# Patient Record
Sex: FEMALE | URBAN - METROPOLITAN AREA
[De-identification: names, ages, dates, MRNs, and addresses within clinical notes are randomized per-mention and may not be internally consistent; named-entity substitution may affect disease eponyms.]

---

## 2018-09-12 ENCOUNTER — TRANSFERRED RECORDS (OUTPATIENT)
Dept: HEALTH INFORMATION MANAGEMENT | Facility: CLINIC | Age: 23
End: 2018-09-12

## 2018-10-19 ENCOUNTER — TRANSFERRED RECORDS (OUTPATIENT)
Dept: HEALTH INFORMATION MANAGEMENT | Facility: CLINIC | Age: 23
End: 2018-10-19

## 2019-02-01 ENCOUNTER — TRANSFERRED RECORDS (OUTPATIENT)
Dept: HEALTH INFORMATION MANAGEMENT | Facility: CLINIC | Age: 24
End: 2019-02-01

## 2019-02-01 ENCOUNTER — MEDICAL CORRESPONDENCE (OUTPATIENT)
Dept: HEALTH INFORMATION MANAGEMENT | Facility: CLINIC | Age: 24
End: 2019-02-01

## 2019-02-12 ENCOUNTER — TELEPHONE (OUTPATIENT)
Dept: OPHTHALMOLOGY | Facility: CLINIC | Age: 24
End: 2019-02-12

## 2019-03-29 ENCOUNTER — OFFICE VISIT (OUTPATIENT)
Dept: OPHTHALMOLOGY | Facility: CLINIC | Age: 24
End: 2019-03-29
Attending: OPHTHALMOLOGY
Payer: COMMERCIAL

## 2019-03-29 ENCOUNTER — ANCILLARY PROCEDURE (OUTPATIENT)
Dept: CT IMAGING | Facility: CLINIC | Age: 24
End: 2019-03-29
Attending: OPHTHALMOLOGY
Payer: COMMERCIAL

## 2019-03-29 DIAGNOSIS — H53.10 SUBJECTIVE VISUAL DISTURBANCE: ICD-10-CM

## 2019-03-29 DIAGNOSIS — H53.2 DOUBLE VISION: ICD-10-CM

## 2019-03-29 DIAGNOSIS — H35.063 RETINAL VASCULITIS OF BOTH EYES: Primary | ICD-10-CM

## 2019-03-29 DIAGNOSIS — H35.063 RETINAL VASCULITIS OF BOTH EYES: ICD-10-CM

## 2019-03-29 LAB
ALBUMIN SERPL-MCNC: 4.4 G/DL (ref 3.4–5)
ALP SERPL-CCNC: 68 U/L (ref 40–150)
ALT SERPL W P-5'-P-CCNC: 18 U/L (ref 0–50)
ANION GAP SERPL CALCULATED.3IONS-SCNC: 6 MMOL/L (ref 3–14)
AST SERPL W P-5'-P-CCNC: 12 U/L (ref 0–45)
BILIRUB SERPL-MCNC: 0.5 MG/DL (ref 0.2–1.3)
BUN SERPL-MCNC: 11 MG/DL (ref 7–30)
CALCIUM SERPL-MCNC: 9.1 MG/DL (ref 8.5–10.1)
CHLORIDE SERPL-SCNC: 105 MMOL/L (ref 94–109)
CO2 SERPL-SCNC: 25 MMOL/L (ref 20–32)
CREAT SERPL-MCNC: 0.61 MG/DL (ref 0.52–1.04)
ERYTHROCYTE [DISTWIDTH] IN BLOOD BY AUTOMATED COUNT: 12.3 % (ref 10–15)
GFR SERPL CREATININE-BSD FRML MDRD: >90 ML/MIN/{1.73_M2}
GLUCOSE SERPL-MCNC: 90 MG/DL (ref 70–99)
HCT VFR BLD AUTO: 43.1 % (ref 35–47)
HGB BLD-MCNC: 13.8 G/DL (ref 11.7–15.7)
MCH RBC QN AUTO: 28.7 PG (ref 26.5–33)
MCHC RBC AUTO-ENTMCNC: 32 G/DL (ref 31.5–36.5)
MCV RBC AUTO: 90 FL (ref 78–100)
MISCELLANEOUS TEST: NORMAL
PLATELET # BLD AUTO: 255 10E9/L (ref 150–450)
POTASSIUM SERPL-SCNC: 3.9 MMOL/L (ref 3.4–5.3)
PROT SERPL-MCNC: 7.9 G/DL (ref 6.8–8.8)
RBC # BLD AUTO: 4.81 10E12/L (ref 3.8–5.2)
SODIUM SERPL-SCNC: 137 MMOL/L (ref 133–144)
WBC # BLD AUTO: 3.9 10E9/L (ref 4–11)

## 2019-03-29 PROCEDURE — 92060 SENSORIMOTOR EXAMINATION: CPT | Mod: ZF | Performed by: OPHTHALMOLOGY

## 2019-03-29 PROCEDURE — G0463 HOSPITAL OUTPT CLINIC VISIT: HCPCS | Mod: 25,ZF

## 2019-03-29 RX ORDER — IOPAMIDOL 755 MG/ML
74 INJECTION, SOLUTION INTRAVASCULAR ONCE
Status: COMPLETED | OUTPATIENT
Start: 2019-03-29 | End: 2019-03-29

## 2019-03-29 RX ADMIN — IOPAMIDOL 74 ML: 755 INJECTION, SOLUTION INTRAVASCULAR at 14:04

## 2019-03-29 ASSESSMENT — TONOMETRY
IOP_METHOD: ICARE
OS_IOP_MMHG: 19
OD_IOP_MMHG: 17

## 2019-03-29 ASSESSMENT — REFRACTION_WEARINGRX
OD_AXIS: 062
OD_CYLINDER: +0.75
SPECS_TYPE: SVL
OS_CYLINDER: SPHERE
OD_SPHERE: -5.75
OS_SPHERE: -5.50

## 2019-03-29 ASSESSMENT — SLIT LAMP EXAM - LIDS
COMMENTS: NORMAL, NO LID RETRACTION
COMMENTS: NORMAL, NO LID RETRACTION

## 2019-03-29 ASSESSMENT — CUP TO DISC RATIO
OD_RATIO: 0.4
OS_RATIO: 0.4

## 2019-03-29 ASSESSMENT — MARGIN REFLEX DISTANCE
OS_MRD1: 3
OD_MRD1: 3

## 2019-03-29 ASSESSMENT — CONF VISUAL FIELD
OD_NORMAL: 1
OS_NORMAL: 1

## 2019-03-29 ASSESSMENT — VISUAL ACUITY
OD_CC: 20/20
OS_CC: 20/20
CORRECTION_TYPE: GLASSES
OD_CC+: -2
METHOD: SNELLEN - LINEAR

## 2019-03-29 ASSESSMENT — LAGOPHTHALMOS
OD_LAGOPHTHALMOS: 0
OS_LAGOPHTHALMOS: 0

## 2019-03-29 NOTE — LETTER
2019    RE: Liu Jackman  : 1995  MRN: [id number]    Dear Dr. Araujo,    Thank you for referring your patient, Liu Jackman, to my neuro-ophthalmology clinic recently.  After a thorough neuro-ophthalmic history and examination, I came to the following conclusions:      1. Esotropia x 1 year duration with binocular diplopia.  Relatively comitant angle and nearly full motility which could suggest decompensation of a congenital phoria however the patient also demonstrates definite evidence of perivenular sheathing / candle wax drippings bilaterally and possible borderline bilateral enlarged lacrimal glands on examination:     Liu Jackman is a pleasant 23 year old  female who presents to my neuro-ophthalmology clinic today for esotropia and double vision.     She first double vision and turning in of the left eye around . It took about 3-4 months and her left eye gradually turned in more and more. It then stabilized. She has constant horizontal double vision. No variation. No droopy eyelids. No trouble swallowing, drooling during the day. She does not patch for diplopia relief. She has gotten used to the double vision.     She saw Dr. Araujo 2018 and . Per patient, she was told that there was no change in her exam.     MRI face neck orbit with and without contrast 10/19/18 - bilaterally enlarged rectus muscles per radiology report.  On my review of the images I do not see that the medial rectus muscles are any more enlarged than other recti muscles. All muscles look possibly borderline enlarged but this is not definitive in my mind and could still be physiologic.  The lacrimal glands bilaterally also looked possibly mildly enlarged bilaterally.    Labs: TSH 1.206 (within normal limit). TSI within normal limits 3/11/2019. She had a thyroid ultrasound done on 3/22/19 - which per patient was unremarkable.     She denies any pain to her  lacrimal gland area, or pain with eye movement. She denies any history of rashes or trouble breathing/history of asthma. No history of cheek enlargement     Past med history: none  Meds: none  Past ocular history: none, denies history of strabismus or lazy eye  Family history: mom had thyroid cancer, no fam history of autoimmune conditions, including lupus, sarcoidosis, rheumatoid arthritis     On examination, visual acuity is 20/20 both eyes. She has a relatively comitant esotropia with trace abduction deficit bilaterally and a 30-45 prism diopter esotropia in all gaze positions. She does not have proptosis, eyelid retraction. Her bilateral lacrimal glands are borderline enlarged/inflammed. Dilated fundus exam showed perivenular peripheral vascular sheathing of both eyes- most prominent inferiorly.  There was only trace anterior vitreous debris / cells.  Cranial nerves V1-3, 7,8,9,11, and 12 were normal bilaterally.    PLAN:     In conclusion we have a 23-year-old  female with 1 year of binocular diplopia and a relatively concomitant esotropia.  She denies much in the way of any other associated symptoms.  The pattern of misalignment that she demonstrates could be consistent with decompensated congenital phoria however her retinal findings today of rony-venular vasculitis strongly suggest that there may be an alternative etiology.  The retinal findings today indicate a retinal vasculitis.  I will institute an initial workup for retinal vasculitis including a CT chest to look for evidence of sarcoid.  Depending on her testing results we may consider a trial of prednisone to see if this changes her esotropia.  I suppose it is also possible that the patient has a decompensated congenital esophoria but also harbor an unrelated asymptomatic retinal vasculitis.  This would seem most unusual however.      Order:  CT chest with contrast   Vasculitis panel, CBC, CMP, lymes, quant gold, RPR, Anti-nuclear  antibody, dsDNA, Sarcoidosis panel     Follow-up in 6 weeks or sooner depending on test results.  If esotropia remains stable for 6 months and does not respond to steroid treatment then could consider strabismus surgery.  Referral to retina if no definite etiology found for retinal findings.      Again, thank you for trusting me with the care of your patient.  For further exam details, please feel free to contact our office for additional records.  If you wish to contact me regarding this patient please email me at [email or web address] or give my clinic a call to arrange a phone conversation.    Sincerely,    Patricio Pathak MD  , Neuro-Ophthalmology and Adult Strabismus Surgery  The Gaby SLAUGHTER and Vivian Mayorga Chair in Neuro-Ophthalmology  Department of Ophthalmology and Visual Neurosciences  [facility]    DX: esotropia, retinal vasculitis

## 2019-03-29 NOTE — DISCHARGE INSTRUCTIONS

## 2019-03-29 NOTE — PROGRESS NOTES
1. Esotropia x 1 year duration with binocular diplopia.  Relatively comitant angle and nearly full motility which could suggest decompensation of a congenital phoria however the patient also demonstrates definite evidence of perivenular sheathing / candle wax drippings bilaterally and possible borderline bilateral enlarged lacrimal glands on examination:     Liu Jackman is a pleasant 23 year old  female who presents to my neuro-ophthalmology clinic today for esotropia and double vision.     She first double vision and turning in of the left eye around Jan of 2018. It took about 3-4 months and her left eye gradually turned in more and more. It then stabilized. She has constant horizontal double vision. No variation. No droopy eyelids. No trouble swallowing, drooling during the day. She does not patch for diplopia relief. She has gotten used to the double vision.     She saw Dr. Araujo September 2018 and Jan of 2019. Per patient, she was told that there was no change in her exam.     MRI face neck orbit with and without contrast 10/19/18 - bilaterally enlarged rectus muscles per radiology report.  On my review of the images I do not see that the medial rectus muscles are any more enlarged than other recti muscles. All muscles look possibly borderline enlarged but this is not definitive in my mind and could still be physiologic.  The lacrimal glands bilaterally also looked possibly mildly enlarged bilaterally.    Labs: TSH 1.206 (within normal limit). TSI within normal limits 3/11/2019. She had a thyroid ultrasound done on 3/22/19 - which per patient was unremarkable.     She denies any pain to her lacrimal gland area, or pain with eye movement. She denies any history of rashes or trouble breathing/history of asthma. No history of cheek enlargement     Past med history: none  Meds: none  Past ocular history: none, denies history of strabismus or lazy eye  Family history: mom had thyroid cancer,  no fam history of autoimmune conditions, including lupus, sarcoidosis, rheumatoid arthritis     On examination, visual acuity is 20/20 both eyes. She has a relatively comitant esotropia with trace abduction deficit bilaterally and a 30-45 prism diopter esotropia in all gaze positions. She does not have proptosis, eyelid retraction. Her bilateral lacrimal glands are borderline enlarged/inflammed. Dilated fundus exam showed perivenular peripheral vascular sheathing of both eyes- most prominent inferiorly.  There was only trace anterior vitreous debris / cells.  Cranial nerves V1-3, 7,8,9,11, and 12 were normal bilaterally.    PLAN:     In conclusion we have a 23-year-old  female with 1 year of binocular diplopia and a relatively concomitant esotropia.  She denies much in the way of any other associated symptoms.  The pattern of misalignment that she demonstrates could be consistent with decompensated congenital phoria however her retinal findings today of rony-venular vasculitis strongly suggest that there may be an alternative etiology.  The retinal findings today indicate a retinal vasculitis.  I will institute an initial workup for retinal vasculitis including a CT chest to look for evidence of sarcoid.  Depending on her testing results we may consider a trial of prednisone to see if this changes her esotropia.  I suppose it is also possible that the patient has a decompensated congenital esophoria but also harbor an unrelated asymptomatic retinal vasculitis.  This would seem most unusual however.      Order:  CT chest with contrast   Vasculitis panel, CBC, CMP, lymes, quant gold, RPR, Anti-nuclear antibody, dsDNA, Sarcoidosis panel     Follow-up in 6 weeks or sooner depending on test results.  If esotropia remains stable for 6 months and does not respond to steroid treatment then could consider strabismus surgery.  Referral to retina if no definite etiology found for retinal findings.         I  spent a total of 60 minutes face to face with Liu Jackman during today's office visit.  Over 50% of this time was spent counseling the patient and/or coordinating care regarding her esotropia and retinal vasculitis.    Complete documentation of historical and exam elements from today's encounter can be found in the full encounter summary report (not reduplicated in this progress note).  I personally obtained the chief complaint(s) and history of present illness.  I confirmed and edited as necessary the review of systems, past medical/surgical history, family history, social history, and examination findings as documented by others; and I examined the patient myself.  I personally reviewed the relevant tests, images, and reports as documented above.  I formulated and edited as necessary the assessment and plan and discussed the findings and management plan with the patient and family.  I personally reviewed the ophthalmic test(s) associated with this encounter, agree with the interpretation(s) as documented by the resident/fellow, and have edited the corresponding report(s) as necessary.     Patricio Pathak MD

## 2019-03-29 NOTE — NURSING NOTE
Chief Complaint(s) and History of Present Illness(es)     Strabismus Evaluation     In both eyes.  Associated symptoms include Negative for headaches, blurred vision, eye pain and dizziness.              Comments     Pt here for a strabismus evaluation referred by Dr. Araujo. Pt notes LE turning in causing him to have double vision. Pt noticed the LE turning in around Jan 2018. Pt states it was a gradual change with the LE (ex turning in when tired), but now LE is always turned in and pt cannot get it to go back to being straight. Pt states she gets the double vision with BE together and then it goes away when she closes one eye.     Tana Lynn, COMT 10:24 AM March 29, 2019

## 2019-03-30 LAB
MYELOPEROXIDASE AB SER-ACNC: <0.2 AI (ref 0–0.9)
PROTEINASE3 IGG SER-ACNC: <0.2 AI (ref 0–0.9)
T PALLIDUM AB SER QL: NONREACTIVE

## 2019-04-01 LAB
ANA PAT SER IF-IMP: ABNORMAL
ANA SER QL IF: ABNORMAL
ANA TITR SER IF: ABNORMAL {TITER}
B BURGDOR IGG+IGM SER QL: 0.03 (ref 0–0.89)
GAMMA INTERFERON BACKGROUND BLD IA-ACNC: 0.03 IU/ML
M TB IFN-G BLD-IMP: NEGATIVE
M TB IFN-G CD4+ BCKGRND COR BLD-ACNC: >10 IU/ML
MITOGEN IGNF BCKGRD COR BLD-ACNC: 0 IU/ML
MITOGEN IGNF BCKGRD COR BLD-ACNC: 0 IU/ML

## 2019-04-02 ENCOUNTER — TELEPHONE (OUTPATIENT)
Dept: OPHTHALMOLOGY | Facility: CLINIC | Age: 24
End: 2019-04-02

## 2019-04-02 DIAGNOSIS — H04.003 DACRYOADENITIS OF BOTH LACRIMAL GLANDS: Primary | ICD-10-CM

## 2019-04-02 LAB
ACE SERPL-CCNC: 14 U/L (ref 9–67)
DSDNA AB SER-ACNC: <1 IU/ML

## 2019-04-02 NOTE — TELEPHONE ENCOUNTER
Called patient - no answer, no VM    Called dad per request - told him that so far the labs and CT chest have been negative. NExt step would be a bilateral lacrimal gland biopsy to get tissue sample to see what's going on. Just because the labs and CT are negative does not mean she doesn't have vasculitis or sarcoidosis.     The plan would be for her to come down to see Aviva Pathak, retina specialist and Dr. Owens in one day. The following day, she can get the biopsy done with Dr. Owens.     I asked him to pass the message along to Liu and told them to call me back with any questions.     They will hear back shortly regarding details of appointment times/dates.

## 2019-04-02 NOTE — TELEPHONE ENCOUNTER
----- Message from Bertha Kaplan sent at 4/2/2019 11:51 AM CDT -----  After you chat with Ali, when you contact patient, if the patient doesn't answer main number could you contact father with results? I have it saved in comments. Patient/father concerned because patient is busy and might not be able to answer today and sometimes patient's phone doesn't work properly.  Thanks!    [phone]- Josiah marshall- pts dad

## 2019-04-04 LAB
LYSOZYME SERPL-MCNC: <0.7 UG/ML (ref 0–2.75)
RESULT: NORMAL
SEND OUTS MISC TEST CODE: NORMAL
SEND OUTS MISC TEST SPECIMEN: NORMAL
TEST NAME: NORMAL

## 2019-04-05 LAB — LAB SCANNED RESULT: NORMAL

## 2019-04-17 ENCOUNTER — TELEPHONE (OUTPATIENT)
Dept: OPHTHALMOLOGY | Facility: CLINIC | Age: 24
End: 2019-04-17

## 2019-04-17 NOTE — TELEPHONE ENCOUNTER
Spoke with patient to schedule surgery with Dr. Yuridia Owens.    Surgery was scheduled on 05/02 at [facility]  Patient will have H&P at [facility]   Post-Op visit: TBD  Patient is aware a / is needed day of surgery.   Surgery packet was mailed and e-mailed, patient has my direct contact information for any further questions.

## 2019-04-29 DIAGNOSIS — H35.063 RETINAL VASCULITIS OF BOTH EYES: Primary | ICD-10-CM

## 2019-05-01 ENCOUNTER — OFFICE VISIT (OUTPATIENT)
Dept: OPHTHALMOLOGY | Facility: CLINIC | Age: 24
End: 2019-05-01
Payer: COMMERCIAL

## 2019-05-01 ENCOUNTER — OFFICE VISIT (OUTPATIENT)
Dept: OPHTHALMOLOGY | Facility: CLINIC | Age: 24
End: 2019-05-01
Attending: OPHTHALMOLOGY
Payer: COMMERCIAL

## 2019-05-01 ENCOUNTER — ANESTHESIA EVENT (OUTPATIENT)
Dept: SURGERY | Facility: AMBULATORY SURGERY CENTER | Age: 24
End: 2019-05-01

## 2019-05-01 DIAGNOSIS — H04.003 DACRYOADENITIS OF BOTH LACRIMAL GLANDS: ICD-10-CM

## 2019-05-01 DIAGNOSIS — H35.063 RETINAL VASCULITIS OF BOTH EYES: ICD-10-CM

## 2019-05-01 DIAGNOSIS — H53.2 DOUBLE VISION: ICD-10-CM

## 2019-05-01 DIAGNOSIS — H35.063 RETINAL VASCULITIS OF BOTH EYES: Primary | ICD-10-CM

## 2019-05-01 DIAGNOSIS — H53.10 SUBJECTIVE VISUAL DISTURBANCE: Primary | ICD-10-CM

## 2019-05-01 PROCEDURE — 40000269 ZZH STATISTIC NO CHARGE FACILITY FEE: Mod: ZF

## 2019-05-01 PROCEDURE — 92133 CPTRZD OPH DX IMG PST SGM ON: CPT | Mod: ZF | Performed by: OPHTHALMOLOGY

## 2019-05-01 PROCEDURE — 92134 CPTRZ OPH DX IMG PST SGM RTA: CPT | Mod: ZF | Performed by: OPHTHALMOLOGY

## 2019-05-01 PROCEDURE — 99243 OFF/OP CNSLTJ NEW/EST LOW 30: CPT | Mod: 57 | Performed by: OPHTHALMOLOGY

## 2019-05-01 PROCEDURE — G0463 HOSPITAL OUTPT CLINIC VISIT: HCPCS | Mod: ZF

## 2019-05-01 PROCEDURE — 92083 EXTENDED VISUAL FIELD XM: CPT | Mod: ZF | Performed by: OPHTHALMOLOGY

## 2019-05-01 PROCEDURE — 92235 FLUORESCEIN ANGRPH MLTIFRAME: CPT | Mod: ZF | Performed by: OPHTHALMOLOGY

## 2019-05-01 RX ORDER — CIPROFLOXACIN 500 MG/1
TABLET, FILM COATED ORAL 2 TIMES DAILY
Refills: 0 | COMMUNITY
Start: 2019-04-23

## 2019-05-01 RX ORDER — PREDNISONE 20 MG/1
40 TABLET ORAL DAILY
Qty: 90 TABLET | Refills: 0 | Status: SHIPPED | OUTPATIENT
Start: 2019-05-02 | End: 2019-06-19

## 2019-05-01 ASSESSMENT — REFRACTION_WEARINGRX
SPECS_TYPE: SVL
SPECS_TYPE: SVL
OS_SPHERE: -5.50
OD_CYLINDER: +0.75
OD_CYLINDER: +0.75
OS_CYLINDER: SPHERE
OD_SPHERE: -5.75
OS_SPHERE: -5.50
OD_SPHERE: -5.75
OD_AXIS: 062
OD_AXIS: 062
OS_CYLINDER: SPHERE

## 2019-05-01 ASSESSMENT — VISUAL ACUITY
CORRECTION_TYPE: GLASSES
OS_CC+: -1
CORRECTION_TYPE: GLASSES
OS_CC: 20/20
OD_CC: 20/20
METHOD: SNELLEN - LINEAR
METHOD: SNELLEN - LINEAR
OD_CC: 20/20
OS_CC: 20/20
OS_CC: 20/20
CORRECTION_TYPE: GLASSES
OD_CC: J1+
METHOD: SNELLEN - LINEAR
OS_CC: J1+
OS_CC+: -1
OD_CC: 20/15

## 2019-05-01 ASSESSMENT — SLIT LAMP EXAM - LIDS
COMMENTS: NORMAL, NO LID RETRACTION

## 2019-05-01 ASSESSMENT — CONF VISUAL FIELD
METHOD: COUNTING FINGERS
METHOD: COUNTING FINGERS
OD_NORMAL: 1
OD_NORMAL: 1
OS_NORMAL: 1
OS_NORMAL: 1

## 2019-05-01 ASSESSMENT — CUP TO DISC RATIO
OD_RATIO: 0.4
OS_RATIO: 0.4
OS_RATIO: 0.4
OD_RATIO: 0.4

## 2019-05-01 ASSESSMENT — TONOMETRY
OS_IOP_MMHG: 15
IOP_METHOD: ICARE
IOP_METHOD: ICARE
OD_IOP_MMHG: 14
OD_IOP_MMHG: 14
OS_IOP_MMHG: 15

## 2019-05-01 NOTE — NURSING NOTE
Chief Complaints and History of Present Illnesses   Patient presents with     New Patient     Chief Complaint(s) and History of Present Illness(es)     New Patient               Comments     New patient for retinal evaluation.

## 2019-05-01 NOTE — PROGRESS NOTES
1. Comitant esotropia - stable measurements today.  Patient intolerant of Fresnel's today.  May be decompensated phoria versus vasculitis related.     2. Retinal vasculitis- exam today unchanged. Patient to see retina today.  Work-up thus far unrevealing.  Proceed with bilateral lacrimal gland biopsy tomorrow with Dr. Owens.      Alignment stable today. Patient denies afferent visual dysfunction (blurred or missing vision).  Per patient diplopia stable since last visit.  Patient does not like the blur induced by the Fresnel and thus prefers to go without prism.     Observe strabismus for stability.  Patient will likely initiate treatment with corticosteroids after biopsy as per Dr. Chase who patient saw today.    Octopus automated 30 degree visual field reliable and full in both eyes.    Follow-up with me in 3 months.  If strabismus unchanged then consider strabismus surgery at that time.         Complete documentation of historical and exam elements from today's encounter can be found in the full encounter summary report (not reduplicated in this progress note).  I personally re-obtained the chief complaint(s) and history of present illness.  I confirmed and edited as necessary the review of systems, past medical/surgical history, family history, social history, and examination findings as documented by others; and I examined the patient myself.  I personally reviewed the relevant tests, images, and reports as documented above.  I formulated and edited as necessary the assessment and plan and discussed the findings and management plan with the patient and family     A medical student was involved in the care of the patient. I was present with the medical student who participated in the service and in the documentation of the note. I have  verified the history and personally performed the physical exam and medical decision making. I extensively reviewed and edited when necessary the assessment and  plan. I agree with the assessment and plan of care as documented in the note    MD Lisa Deluna, MS4

## 2019-05-01 NOTE — PROGRESS NOTES
CC: establish care for possible retinal vasculitis     HPI: Liu Jackman is a  23 year old year-old patient referred by Dr Solares for evaluation and treat of possilbe vasculitis vs sarcoiditis. She was seen by Dr. 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 Though, exam revealed perivenular sheathing and candle wax drippings bilaterally. She is scheduled for a lacrimal biopsy 5-2-19 with Dr Shi. She reports no recent changes to visual acuity, no eye pain, no recent illness, no flashes or floaters. No family history of eye disease. No smoking.     Retinal Imaging:  OCT 5-1-19  RE: normal retinal layers, no IRF/SRF  LE: normal retinal layers, no IRF/SRF     Optos fundus 3/29/19    RIGHT eye: consistent with exam  left eye: consistent with exam    FA 5/1/19   right eye: good fill, peripheral leakage consistent with vasculitis  Left eye: good fill, peripheral leakage consistent with vasculitis    Laboratory:  Negative: quant gold, dsDNA, lyme, Anti-nuclear antibody, treponema Abs, Myeloperoxidase AbIgG, Preteinase 3 AbIgG, chitotriosidase, ACE, lysozyme, IL2     Assessment & Plan:    1. Retinal vasculitis both eyes    - No recent illness    - CT chest without evidence of sarcoid    - Laboratory workup negative (see above) for infectious etiology    - FA today with active vasculitis both eyes    - scheduled for lacrimal gland biposy with Dr. Owens on 5/2/19       - Start prednisone 40 mg daily (after biopsy)    - raniditine 150 mg twice a day    - If biopsy unrevealing will refer to Dr. Apodaca/ uveitis for further evaluation     2. 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 2/2 retinal vasculitis    - Follows with Dr. Pathak     3. Myopia both eyes    - Good correction (-5.5 D both eyes)   - Monitor     return to clinic: 4-6 weeks   Will repeat FA transit right eye in 3 months        Lukas Fermin MD  Ophthalmology  Resident, PGY-3  [facility]      ~~~~~~~~~~~~~~~~~~~~~~~~~~~~~~~~~~   Complete documentation of historical and exam elements from today's encounter can be found in the full encounter summary report (not reduplicated in this progress note).  I personally obtained the chief complaint(s) and history of present illness.  I confirmed and edited as necessary the review of systems, past medical/surgical history, family history, social history, and examination findings as documented by others; and I examined the patient myself.  I personally reviewed the relevant tests, images, and reports as documented above.  I personally reviewed the ophthalmic test(s) associated with this encounter, agree with the interpretation(s) as documented by the resident/fellow, and have edited the corresponding report(s) as necessary.   I formulated and edited as necessary the assessment and plan and discussed the findings and management plan with the patient and family    Pat Chase MD   of Ophthalmology.  Retina Service   Department of Ophthalmology and Visual Neurosciences   [facility]  Phone: [phone]   Fax: [phone]

## 2019-05-01 NOTE — LETTER
To whom it may concern,    Liu Jackman is under my care at the [facility]. She is undergoing surgery 5/2/2019. Please excuse her absence from 4/30/2019. She can return without restrictions on 5/13/2019. Additionally her father, Josiah Jackman Sr., is providing transportation and postoperative care, please excuse his absence from 4/30/2019 until 5/4/2019.    Please let us know if you have any questions or concerns.     Sincerely,        Yuridia Owens MD    Oculoplastic and Orbital Surgery   Department of Ophthalmology and Visual Neurosciences  [facility]

## 2019-05-01 NOTE — LETTER
5/1/2019       RE: Liu Jackman  [address]  [facility] [address]     Dear Colleague,    Thank you for referring your patient, Liu Jackman, to the EYE CLINIC at [facility]. Please see a copy of my visit note below.       CC: establish care for possible retinal vasculitis     HPI: Liu Jackman is a  23 year old year-old patient referred by Dr Solares for evaluation and treat of possilbe vasculitis vs sarcoiditis. She was seen by Dr. 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 Though, exam revealed perivenular sheathing and candle wax drippings bilaterally. She is scheduled for a lacrimal biopsy 5-2-19 with Dr Shi. She reports no recent changes to visual acuity, no eye pain, no recent illness, no flashes or floaters. No family history of eye disease. No smoking.     Retinal Imaging:  OCT 5-1-19  RE: normal retinal layers, no IRF/SRF  LE: normal retinal layers, no IRF/SRF     Optos fundus 3/29/19    RIGHT eye: consistent with exam  left eye: consistent with exam    FA 5/1/19   right eye: good fill, peripheral leakage consistent with vasculitis  Left eye: good fill, peripheral leakage consistent with vasculitis    Laboratory:  Negative: quant gold, dsDNA, lyme, Anti-nuclear antibody, treponema Abs, Myeloperoxidase AbIgG, Preteinase 3 AbIgG, chitotriosidase, ACE, lysozyme, IL2     Assessment & Plan:    1. Retinal vasculitis both eyes    - No recent illness    - CT chest without evidence of sarcoid    - Laboratory workup negative (see above) for infectious etiology    - FA today with active vasculitis both eyes    - scheduled for lacrimal gland biposy with Dr. Owesn on 5/2/19       - Start prednisone 40 mg daily (after biopsy)    - raniditine 150 mg twice a day    - If biopsy unrevealing will refer to Dr. Apodaca/ uveitis for further evaluation     2. Esotropia   - possible Decompensated congenital phoria  vs. 2/2 retinal vasculitis    - Follows with Dr. Pathak     3. Myopia both eyes    - Good correction (-5.5 D both eyes)   - Monitor     return to clinic: 4-6 weeks   Will repeat FA transit right eye in 3 months        Lukas Fermin MD  Ophthalmology Resident, PGY-3  [facility]      ~~~~~~~~~~~~~~~~~~~~~~~~~~~~~~~~~~   Complete documentation of historical and exam elements from today's encounter can be found in the full encounter summary report (not reduplicated in this progress note).  I personally obtained the chief complaint(s) and history of present illness.  I confirmed and edited as necessary the review of systems, past medical/surgical history, family history, social history, and examination findings as documented by others; and I examined the patient myself.  I personally reviewed the relevant tests, images, and reports as documented above.  I personally reviewed the ophthalmic test(s) associated with this encounter, agree with the interpretation(s) as documented by the resident/fellow, and have edited the corresponding report(s) as necessary.   I formulated and edited as necessary the assessment and plan and discussed the findings and management plan with the patient and family. Pat Chase MD    Again, thank you for allowing me to participate in the care of your patient.      Sincerely,    Pat Chase M.D.   of Ophthalmology  Vitreoretinal Surgeon  Department of Ophthalmology & Visual Neurosciences  [facility]  Phone:  [phone]   Fax:  [phone]

## 2019-05-01 NOTE — PROGRESS NOTES
Chief Complaint(s) and History of Present Illness(es)     No visit information to display       Liu Jackman is a 23 year old referred by Dr. Pathak for consideration of bilateral lacrimal gland biopsy.  About a year and a half ago, she noticed her left eye was turning in.  Has since stabilized.  She was not born with a lazy eye.  Imaging had been obtained, and the lacrimal glands on each side appeared moderately enlarged.  The extraocular muscles on my review appear overall normal.  There may be minimally enlarged.  Laboratory work-up has been unremarkable.    She has no orbital pain.  No orbital swelling.  He did see Dr. Chase this morning for retinal vasculitis also noted by Dr. Pathak and Dr. Solares.    This report was dictated using Dragon voice recognition software.    Assessment & Plan     Liu Jackman is a 23 year old female with the following diagnoses:   1. Retinal vasculitis of both eyes    2. Dacryoadenitis of both lacrimal glands    3. Double vision         We discussed risks benefits and alternatives to the proposed procedure, she elected to proceed with bilateral lacrimal gland biopsy tomorrow.  We discussed the risks in detail including failure to obtain a diagnosis, droopy eyelid, change in double vision, pupil size, and loss of vision.          Attending Physician Attestation:  Complete documentation of historical and exam elements from today's encounter can be found in the full encounter summary report (not reduplicated in this progress note).  I personally obtained the chief complaint(s) and history of present illness.  I confirmed and edited as necessary the review of systems, past medical/surgical history, family history, social history, and examination findings as documented by others; and I examined the patient myself.  I personally reviewed the relevant tests, images, and reports as documented above.  I formulated and edited as necessary the assessment and plan and  discussed the findings and management plan with the patient and family. - Yuridia Owens MD

## 2019-05-01 NOTE — LETTER
2019         RE:  :  MRN: Liu Jackman  1995  [id number]     Dear Dr. Pathak,    Thank you for asking me to see your patient, Liu Jackman, for an oculoplastic   consultation.  My assessment and plan are below.  For further details, please see my attached clinic note.           Chief Complaint(s) and History of Present Illness(es)     No visit information to display       Liu Jackman is a 23 year old referred by Dr. Pathak for consideration of bilateral lacrimal gland biopsy.  About a year and a half ago, she noticed her left eye was turning in.  Has since stabilized.  She was not born with a lazy eye.  Imaging had been obtained, and the lacrimal glands on each side appeared moderately enlarged.  The extraocular muscles on my review appear overall normal.  There may be minimally enlarged.  Laboratory work-up has been unremarkable.    She has no orbital pain.  No orbital swelling.  He did see Dr. Chase this morning for retinal vasculitis also noted by Dr. Pathak and Dr. Solares.    This report was dictated using Dragon voice recognition software.    Assessment & Plan     Liu Jackman is a 23 year old female with the following diagnoses:   1. Retinal vasculitis of both eyes    2. Dacryoadenitis of both lacrimal glands    3. Double vision         We discussed risks benefits and alternatives to the proposed procedure, she elected to proceed with bilateral lacrimal gland biopsy tomorrow.  We discussed the risks in detail including failure to obtain a diagnosis, droopy eyelid, change in double vision, pupil size, and loss of vision.         Again, thank you for allowing me to participate in the care of your patient.      Sincerely,    Yuridia Owens MD  Department of Ophthalmology and Visual Neurosciences  [facility]    CC: Patricio Pathak MD  [address] [place] [id number]  VIA In Basket

## 2019-05-02 ENCOUNTER — HOSPITAL ENCOUNTER (OUTPATIENT)
Facility: AMBULATORY SURGERY CENTER | Age: 24
End: 2019-05-02
Attending: OPHTHALMOLOGY
Payer: COMMERCIAL

## 2019-05-02 ENCOUNTER — ANESTHESIA (OUTPATIENT)
Dept: SURGERY | Facility: AMBULATORY SURGERY CENTER | Age: 24
End: 2019-05-02

## 2019-05-02 VITALS
OXYGEN SATURATION: 98 % | DIASTOLIC BLOOD PRESSURE: 79 MMHG | TEMPERATURE: 97.6 F | WEIGHT: 180 LBS | BODY MASS INDEX: 28.25 KG/M2 | RESPIRATION RATE: 16 BRPM | HEART RATE: 59 BPM | HEIGHT: 67 IN | SYSTOLIC BLOOD PRESSURE: 124 MMHG

## 2019-05-02 DIAGNOSIS — Z98.890 POSTOPERATIVE EYE STATE: Primary | ICD-10-CM

## 2019-05-02 LAB
HCG UR QL: NEGATIVE
INTERNAL QC OK POCT: YES

## 2019-05-02 RX ORDER — KETAMINE HYDROCHLORIDE 10 MG/ML
INJECTION, SOLUTION INTRAMUSCULAR; INTRAVENOUS PRN
Status: DISCONTINUED | OUTPATIENT
Start: 2019-05-02 | End: 2019-05-02

## 2019-05-02 RX ORDER — ONDANSETRON 4 MG/1
4 TABLET, ORALLY DISINTEGRATING ORAL EVERY 30 MIN PRN
Status: DISCONTINUED | OUTPATIENT
Start: 2019-05-02 | End: 2019-05-03 | Stop reason: HOSPADM

## 2019-05-02 RX ORDER — FENTANYL CITRATE 50 UG/ML
25-50 INJECTION, SOLUTION INTRAMUSCULAR; INTRAVENOUS
Status: DISCONTINUED | OUTPATIENT
Start: 2019-05-02 | End: 2019-05-02 | Stop reason: HOSPADM

## 2019-05-02 RX ORDER — DEXAMETHASONE SODIUM PHOSPHATE 4 MG/ML
INJECTION, SOLUTION INTRA-ARTICULAR; INTRALESIONAL; INTRAMUSCULAR; INTRAVENOUS; SOFT TISSUE PRN
Status: DISCONTINUED | OUTPATIENT
Start: 2019-05-02 | End: 2019-05-02

## 2019-05-02 RX ORDER — ONDANSETRON 2 MG/ML
INJECTION INTRAMUSCULAR; INTRAVENOUS PRN
Status: DISCONTINUED | OUTPATIENT
Start: 2019-05-02 | End: 2019-05-02

## 2019-05-02 RX ORDER — ONDANSETRON 2 MG/ML
4 INJECTION INTRAMUSCULAR; INTRAVENOUS EVERY 30 MIN PRN
Status: DISCONTINUED | OUTPATIENT
Start: 2019-05-02 | End: 2019-05-03 | Stop reason: HOSPADM

## 2019-05-02 RX ORDER — OXYCODONE HYDROCHLORIDE 5 MG/1
5 TABLET ORAL EVERY 6 HOURS PRN
Qty: 15 TABLET | Refills: 0 | Status: SHIPPED | OUTPATIENT
Start: 2019-05-02

## 2019-05-02 RX ORDER — OXYCODONE HYDROCHLORIDE 5 MG/1
5 TABLET ORAL EVERY 4 HOURS PRN
Status: DISCONTINUED | OUTPATIENT
Start: 2019-05-02 | End: 2019-05-03 | Stop reason: HOSPADM

## 2019-05-02 RX ORDER — ACETAMINOPHEN 325 MG/1
975 TABLET ORAL ONCE
Status: COMPLETED | OUTPATIENT
Start: 2019-05-02 | End: 2019-05-02

## 2019-05-02 RX ORDER — NALOXONE HYDROCHLORIDE 0.4 MG/ML
.1-.4 INJECTION, SOLUTION INTRAMUSCULAR; INTRAVENOUS; SUBCUTANEOUS
Status: DISCONTINUED | OUTPATIENT
Start: 2019-05-02 | End: 2019-05-03 | Stop reason: HOSPADM

## 2019-05-02 RX ORDER — LIDOCAINE 40 MG/G
CREAM TOPICAL
Status: DISCONTINUED | OUTPATIENT
Start: 2019-05-02 | End: 2019-05-02 | Stop reason: HOSPADM

## 2019-05-02 RX ORDER — PROPOFOL 10 MG/ML
INJECTION, EMULSION INTRAVENOUS PRN
Status: DISCONTINUED | OUTPATIENT
Start: 2019-05-02 | End: 2019-05-02

## 2019-05-02 RX ORDER — MEPERIDINE HYDROCHLORIDE 25 MG/ML
12.5 INJECTION INTRAMUSCULAR; INTRAVENOUS; SUBCUTANEOUS
Status: DISCONTINUED | OUTPATIENT
Start: 2019-05-02 | End: 2019-05-03 | Stop reason: HOSPADM

## 2019-05-02 RX ORDER — SODIUM CHLORIDE, SODIUM LACTATE, POTASSIUM CHLORIDE, CALCIUM CHLORIDE 600; 310; 30; 20 MG/100ML; MG/100ML; MG/100ML; MG/100ML
INJECTION, SOLUTION INTRAVENOUS CONTINUOUS
Status: DISCONTINUED | OUTPATIENT
Start: 2019-05-02 | End: 2019-05-02 | Stop reason: HOSPADM

## 2019-05-02 RX ORDER — PROPOFOL 10 MG/ML
INJECTION, EMULSION INTRAVENOUS CONTINUOUS PRN
Status: DISCONTINUED | OUTPATIENT
Start: 2019-05-02 | End: 2019-05-02

## 2019-05-02 RX ORDER — ERYTHROMYCIN 5 MG/G
OINTMENT OPHTHALMIC
Qty: 1 TUBE | Refills: 11 | Status: SHIPPED | OUTPATIENT
Start: 2019-05-02

## 2019-05-02 RX ORDER — NEOMYCIN SULFATE, POLYMYXIN B SULFATE AND DEXAMETHASONE 3.5; 10000; 1 MG/ML; [USP'U]/ML; MG/ML
1-2 SUSPENSION/ DROPS OPHTHALMIC 3 TIMES DAILY
Qty: 1 BOTTLE | Refills: 0 | Status: SHIPPED | OUTPATIENT
Start: 2019-05-02 | End: 2019-05-02

## 2019-05-02 RX ORDER — LIDOCAINE HYDROCHLORIDE 20 MG/ML
INJECTION, SOLUTION INFILTRATION; PERINEURAL PRN
Status: DISCONTINUED | OUTPATIENT
Start: 2019-05-02 | End: 2019-05-02

## 2019-05-02 RX ORDER — SODIUM CHLORIDE, SODIUM LACTATE, POTASSIUM CHLORIDE, CALCIUM CHLORIDE 600; 310; 30; 20 MG/100ML; MG/100ML; MG/100ML; MG/100ML
INJECTION, SOLUTION INTRAVENOUS CONTINUOUS
Status: DISCONTINUED | OUTPATIENT
Start: 2019-05-02 | End: 2019-05-03 | Stop reason: HOSPADM

## 2019-05-02 RX ORDER — HYDROCODONE BITARTRATE AND ACETAMINOPHEN 5; 325 MG/1; MG/1
1 TABLET ORAL
Status: DISCONTINUED | OUTPATIENT
Start: 2019-05-02 | End: 2019-05-03 | Stop reason: HOSPADM

## 2019-05-02 RX ORDER — TRIAMCINOLONE ACETONIDE 40 MG/ML
INJECTION, SUSPENSION INTRA-ARTICULAR; INTRAMUSCULAR PRN
Status: DISCONTINUED | OUTPATIENT
Start: 2019-05-02 | End: 2019-05-02 | Stop reason: HOSPADM

## 2019-05-02 RX ORDER — ERYTHROMYCIN 5 MG/G
OINTMENT OPHTHALMIC PRN
Status: DISCONTINUED | OUTPATIENT
Start: 2019-05-02 | End: 2019-05-02 | Stop reason: HOSPADM

## 2019-05-02 RX ADMIN — SODIUM CHLORIDE, SODIUM LACTATE, POTASSIUM CHLORIDE, CALCIUM CHLORIDE: 600; 310; 30; 20 INJECTION, SOLUTION INTRAVENOUS at 11:50

## 2019-05-02 RX ADMIN — PROPOFOL 200 MG: 10 INJECTION, EMULSION INTRAVENOUS at 12:51

## 2019-05-02 RX ADMIN — FENTANYL CITRATE 25 MCG: 50 INJECTION, SOLUTION INTRAMUSCULAR; INTRAVENOUS at 13:58

## 2019-05-02 RX ADMIN — KETAMINE HYDROCHLORIDE 20 MG: 10 INJECTION, SOLUTION INTRAMUSCULAR; INTRAVENOUS at 12:56

## 2019-05-02 RX ADMIN — PROPOFOL 200 MCG/KG/MIN: 10 INJECTION, EMULSION INTRAVENOUS at 12:54

## 2019-05-02 RX ADMIN — ACETAMINOPHEN 975 MG: 325 TABLET ORAL at 11:50

## 2019-05-02 RX ADMIN — OXYCODONE HYDROCHLORIDE 5 MG: 5 TABLET ORAL at 14:00

## 2019-05-02 RX ADMIN — PROPOFOL 50 MG: 10 INJECTION, EMULSION INTRAVENOUS at 12:58

## 2019-05-02 RX ADMIN — DEXAMETHASONE SODIUM PHOSPHATE 4 MG: 4 INJECTION, SOLUTION INTRA-ARTICULAR; INTRALESIONAL; INTRAMUSCULAR; INTRAVENOUS; SOFT TISSUE at 12:51

## 2019-05-02 RX ADMIN — PROPOFOL 200 MCG/KG/MIN: 10 INJECTION, EMULSION INTRAVENOUS at 13:20

## 2019-05-02 RX ADMIN — Medication 0.3 MG: at 13:25

## 2019-05-02 RX ADMIN — FENTANYL CITRATE 25 MCG: 50 INJECTION, SOLUTION INTRAMUSCULAR; INTRAVENOUS at 13:55

## 2019-05-02 RX ADMIN — ONDANSETRON 4 MG: 2 INJECTION INTRAMUSCULAR; INTRAVENOUS at 12:51

## 2019-05-02 RX ADMIN — LIDOCAINE HYDROCHLORIDE 70 MG: 20 INJECTION, SOLUTION INFILTRATION; PERINEURAL at 12:51

## 2019-05-02 ASSESSMENT — LIFESTYLE VARIABLES: TOBACCO_USE: 0

## 2019-05-02 ASSESSMENT — MIFFLIN-ST. JEOR: SCORE: 1601.16

## 2019-05-02 NOTE — OP NOTE
PREOPERATIVE DIAGNOSIS:  Bilateral anterior orbital mass.      POSTOPERATIVE DIAGNOSIS:  Bilateral anterior orbital mass.      PROCEDURE:  Bilateral anterior orbitotomy with biopsy.      SURGEON: Yuridia Owens MD    ASSISTANT:  Jori Cruz MD      ANESTHESIA:  General with local infiltration of a 50/50 mixture of 2% lidocaine with epinephrine and 0.5% Marcaine.      COMPLICATIONS:  None.      ESTIMATED BLOOD LOSS:  Less than 5 mL.     SPECIMENS:  Bilateral  orbital mass fresh tissue evaluation for lymphoma workup and in formalin for permanent histopathology     HISTORY:  Liu Jackman  presented with a mass in the bilateral superior temporal orbit in the region of the lacrimal fossa.  After the risks, benefits and alternatives to the proposed procedure were explained, informed consent was obtained.      DESCRIPTION OF PROCEDURE:  Liu Jackman  was brought to the operating room and placed supine on the operating table.  General anesthesia was induced.  The bilateral upper lid creases were marked with a marking pen and infiltrated with local anesthetic.  The area was prepped and draped in the typical sterile ophthalmic fashion.  Attention was directed to the right  side.  Lid crease incision was made with a 15 blade and dissection carried down to the orbicularis with high temperature cautery.  Orbital septum was opened horizontally.  In the region of the lacrimal fossa, there was a firm mass in the region of the lacrimal gland.  Biopsy was obtained using a #15 blade.  Hemostasis was obtained with monopolar cautery.  The biopsy was split in two, half placed in saline for fresh tissue evaluation for possible lymphoma and the second in formalin.  0.5mL of Kenalog 40 was infiltrated into the orbit around the lacrimal gland. Once meticulous hemostasis was obtained, the skin was closed with running 6-0 plain gut suture.  Attention was directed to the opposite side and the same procedure was performed.  Erythromycin ointment was applied to the incision.  Liu Jackman  tolerated the procedure well.  Liu Jakcman  left the operating room in stable condition.        Yuridia Owens MD

## 2019-05-02 NOTE — DISCHARGE INSTRUCTIONS
Post-operative Instructions  Ophthalmic Plastic and Reconstructive Surgery    Yuridia Owens M.D.     All instructions apply to the operated eye(s) or eyelid(s).    Wound care and personal care  ? If a patch or bandage has been placed, please leave this in place until seen by your physician. Ensure that the bandage does not get wet when you take a shower.  ? Apply ice compresses 15 minutes of every hour off while awake for 2 days. As long as there is no further bleeding, after two days, switch to warm water compresses for five minutes, four times a day until seen by your physician. Instead of warm water compresses, you can use a cup of dry uncooked rice in a clean cotton sock. Place sock in microwave for 30 seconds to one minute. Next place the warm sock in a plastic bag, and place it over a clean warm wet washcloth over operated eye.    ? You may shower or wash your hair the day after surgery. Do not submerge your head while bathing or go swimming for 2 weeks to prevent contamination of your wounds.  ? Do not apply make-up to the eyes or eyelids for 2 weeks after surgery.  ? Expect some swelling, bruising, black eye (even into the lower eyelids and cheeks). Also expect serum caking, crusting and discharge from the eye and/or incisions. A small amount of surface bleeding, and depending on the type of surgery, bleeding from the inside of the eyelid, is normal for the first 48 hours.  ? Avoid straining, bending at the waist, or lifting more than 15 pounds for 10 days. Activities that raise your blood pressure can worsen swelling, cause bleeding, and breaking of sutures. Like wise, sleeping with your head slightly elevated for the first several days can help swelling resolve more quickly.   ? Do continue to ambulate (walk) as you normally would - being sedentary after surgery can cause blood clots.   ? Your eye(s) and eyelid(s) may be painful and tender. This is normal after surgery.      Contact information and  follow-up  ? Return to the Eye Clinic for a follow-up appointment with your physician as scheduled. If no appointment has been scheduled:   - [facility] eye clinic: [phone] for an appointment with Dr. Owens within 1 to 2 weeks from your date of surgery.   -  [facility] eye clinic: [phone] for an appointment with Dr. Owens within 1 to 2 weeks from your date of surgery.     ? For severe pain, bleeding, or loss of vision, call the [facility] at [phone] or [facility] at [phone].     After hours or on weekends and holidays, call [phone] and ask to speak with the ophthalmologist on call.    An on call person can be reached after hours for concerns. The on call doctor should not call in medication refill requests after hours or on weekends, so please plan accordingly. An effort has been made to provide adequate pain medications following every surgery, and refills will not be provided in most instances. Narcotic pain medications cannot be called in.     Activity restrictions and driving  ? Avoid heavy lifting, bending, exercise or strenuous activity for 1 week after surgery.  You may resume other activities and return to work as tolerated.  ? You may not resume driving if you are using narcotic pain medications (such as Norco, Percocet, Tylenol #3).    Medications  ? Restart all regular home medications and eye drops. If you take Plavix or  Aspirin on a regular basis, wait for 72 hours after your surgery before restarting these in order to decrease the risk of bleeding complications.  ? Avoid aspirin and aspirin-like medications (Motrin, Aleve, Ibuprofen, Jennifer-Eldon etc) for 72 hours to reduce the risk of bleeding. You may take Tylenol (acetaminophen) for pain.  ? In addition to your home medications, take the following post-operative medications as prescribed by your physician.    ? Apply antibiotic ointment to all  sutures three times a day until tube is empty.  ? In many cases, postoperative discomfort can be managed with Tylenol alone. If narcotic pain medication was prescribed, take pain pills at prescribed frequency as needed for pain.    ? WARNING: Most prescription pain medications contain Tylenol  (acetaminophen). You must not take more than 4,000 mg of acetaminophen per  24-hour period. This is equivalent to 6 tablets of Darvocet, 12 tablets of Norco, Percocet or Tylenol #3. If you take other over-the-counter medications containing acetaminophen, you must take the amount of acetaminophen into account and reduce the number of prescribed pain pills accordingly.  ? Prescribed narcotic medications may make you drowsy. You must not drive a car, operate heavy machinery or drink alcohol while taking them.  ? Prescribed narcotic pain medications may cause constipation and nausea. Take them with some food to prevent a stomach upset.    [facility]  Home Care Following Anesthesia  For 24 hours after surgery:  1. Get plenty of rest.  A responsible adult must stay with you for at least 24 hours after you leave the surgery center.  2. Do not drive or use heavy equipment.  If you have weakness or tingling, don't drive or use heavy equipment until this feeling goes away.   3. Do not drink alcohol.   4. Avoid strenuous or risky activities.  Ask for help when climbing stairs.  5. You may feel lightheaded.  IF so, sit for a few minutes before standing.  Have someone help you get up.   6. If you have nausea (feel sick to your stomach): Drink only clear liquids such as apple juice, ginger ale, broth or 7-Up.  Rest may also help.  Be sure to drink enough fluids.  Move to a regular diet as you feel able.   7. You may have a slight fever.  Call the doctor if your fever is over 100 F (37.7 C) (taken under the tongue) or lasts longer than 24 hours.  8. You may have a dry mouth, a sore throat, muscle aches  or trouble sleeping. These should go away after 24 hours.  9. Do not make important or legal decisions.               Tips for taking pain medications  To get the best pain relief possible, remember these points:    Take pain medications as directed, before pain becomes severe.    Pain medication can upset your stomach: taking it with food may help.    Constipation is a common side effect of pain medication. Drink plenty of  fluids.    Eat foods high in fiber. Take a stool softener if recommended by your doctor or pharmacist.    Do not drink alcohol, drive or operate machinery while taking pain medications.    Ask about other ways to control pain, such as with heat, ice or relaxation.    Tylenol/Acetaminophen Consumption  To help encourage the safe use of acetaminophen, the makers of TYLENOL  have lowered the maximum daily dose for single-ingredient Extra Strength TYLENOL  (acetaminophen) products sold in the [place] from 8 pills per day (4,000 mg) to 6 pills per day (3,000 mg). The dosing interval has also changed from 2 pills every 4-6 hours to 2 pills every 6 hours.    If you feel your pain relief is insufficient, you may take Tylenol/Acetaminophen in addition to your narcotic pain medication.     Be careful not to exceed 3,000 mg of Tylenol/Acetaminophen in a 24 hour period from all sources.    If you are taking extra strength Tylenol/acetaminophen (500 mg), the maximum dose is 6 tablets in 24 hours.    If you are taking regular strength acetaminophen (325 mg), the maximum dose is 9 tablets in 24 hours.    Call a doctor for any of the followin. Signs of infection (fever, growing tenderness at the surgery site, a large amount of drainage or bleeding, severe pain, foul-smelling drainage, redness, swelling).  2. It has been over 8 to 10 hours since surgery and you are still not able to urinate (pass water).  3. Headache for over 24 hours.  4. Numbness, tingling or weakness the day after surgery (if you had spinal  anesthesia).  Your doctor is:       Dr. Yuridia Owens, Ophthalmology: [phone]               Or dial [phone] and ask for the resident on call for:  Ophthalmology  For emergency care, call the:  [facility]:  [phone] (TTY for hearing impaired: [phone])

## 2019-05-02 NOTE — ANESTHESIA PREPROCEDURE EVALUATION
Anesthesia Pre-Procedure Evaluation    Patient: Liu Jackman   MRN:     [id number] Gender:   female   Age:    23 year old :      1995        Preoperative Diagnosis: Dacryoadenitis   Procedure(s):  Bilateral Orbitotomy and Lacrimal Gland Biopsy     History reviewed. No pertinent past medical history.   History reviewed. No pertinent surgical history.       Anesthesia Evaluation     . Pt has not had prior anesthetic            ROS/MED HX    ENT/Pulmonary:      (-) tobacco use   Neurologic:  - neg neurologic ROS     Cardiovascular:  - neg cardiovascular ROS       METS/Exercise Tolerance:  >4 METS   Hematologic:  - neg hematologic  ROS       Musculoskeletal:  - neg musculoskeletal ROS       GI/Hepatic:  - neg GI/hepatic ROS       Renal/Genitourinary:  - ROS Renal section negative       Endo:  - neg endo ROS       Psychiatric:  - neg psychiatric ROS       Infectious Disease:  - neg infectious disease ROS       Malignancy:      - no malignancy   Other:    (+) No chance of pregnancy                        PHYSICAL EXAM:   Mental Status/Neuro: A/A/O   Airway: Facies: Feasible  Mallampati: II  Mouth/Opening: Full  TM distance: > 6 cm  Neck ROM: Full   Respiratory: Auscultation: CTAB     Resp. Rate: Normal     Resp. Effort: Normal      CV: Rhythm: Regular  Rate: Age appropriate  Heart: Normal Sounds   Comments:      Dental: Normal                  Lab Results   Component Value Date    WBC 3.9 (L) 2019    HGB 13.8 2019    HCT 43.1 2019     2019     2019    POTASSIUM 3.9 2019    CHLORIDE 105 2019    CO2 25 2019    BUN 11 2019    CR 0.61 2019    GLC 90 2019    NICKOLAS 9.1 2019    ALBUMIN 4.4 2019    PROTTOTAL 7.9 2019    ALT 18 2019    AST 12 2019    ALKPHOS 68 2019    BILITOTAL 0.5 2019    HCG Negative 2019       Preop Vitals  BP Readings from Last 3 Encounters:   19 121/80    Pulse  "Readings from Last 3 Encounters:   No data found for Pulse      Resp Readings from Last 3 Encounters:   05/02/19 16    SpO2 Readings from Last 3 Encounters:   05/02/19 95%      Temp Readings from Last 1 Encounters:   05/02/19 36.7  C (98.1  F) (Oral)    Ht Readings from Last 1 Encounters:   05/02/19 1.689 m (5' 6.5\")      Wt Readings from Last 1 Encounters:   05/02/19 81.6 kg (180 lb)    Estimated body mass index is 28.62 kg/m  as calculated from the following:    Height as of this encounter: 1.689 m (5' 6.5\").    Weight as of this encounter: 81.6 kg (180 lb).     LDA:  Peripheral IV 03/29/19 Right (Active)   Number of days: 34       Peripheral IV 05/02/19 Right Hand (Active)   Site Assessment WDL 5/2/2019 12:04 PM   Line Status Infusing 5/2/2019 12:04 PM   Phlebitis Scale 0-->no symptoms 5/2/2019 12:04 PM   Infiltration Scale 0 5/2/2019 12:04 PM   Extravasation? No 5/2/2019 12:04 PM   Dressing Intervention New dressing  5/2/2019 12:04 PM   Number of days: 0       ETT (adult) (Active)   Number of days: 0            Assessment:   ASA SCORE: 1    NPO Status: > 6 hours since completed Solid Foods   Documentation: H&P complete; Preop Testing complete; Consents complete   Proceeding: Proceed without further delay  Tobacco Use:  NO Active use of Tobacco/UNKNOWN Tobacco use status     Plan:   Anes. Type:  General   Pre-Induction: Midazolam IV; Acetaminophen PO   Induction:  IV (Standard)   Airway: LMA   Access/Monitoring: PIV   Maintenance: Balanced   Emergence: Procedure Site   Logistics: Same Day Surgery     Postop Pain/Sedation Strategy:  Standard-Options: Opioids PRN     PONV Management:  Adult Risk Factors: Female, Non-Smoker, Postop Opioids  Prevention: Ondansetron; Dexamethasone     CONSENT: Direct conversation   Plan and risks discussed with: Patient   Blood Products: Consent Deferred (Minimal Blood Loss)                         Bryan Appiah MD  "

## 2019-05-02 NOTE — ANESTHESIA POSTPROCEDURE EVALUATION
Anesthesia POST Procedure Evaluation    Patient: Liu Jackman   MRN:     [id number] Gender:   female   Age:    23 year old :      1995        Preoperative Diagnosis: Dacryoadenitis   Procedure(s):  Bilateral Orbitotomy and Lacrimal Gland Biopsy   Postop Comments: No value filed.       Anesthesia Type:  General  No value filed.    Reportable Event: NO     PAIN: Uncomplicated   Sign Out status: Comfortable, Well controlled pain     PONV: No PONV   Sign Out status:  No Nausea or Vomiting     Neuro/Psych: Uneventful perioperative course   Sign Out Status: Preoperative baseline; Age appropriate mentation     Airway/Resp.: Uneventful perioperative course   Sign Out Status: Non labored breathing, age appropriate RR; Resp. Status within EXPECTED Parameters     CV: Uneventful perioperative course   Sign Out status: Appropriate BP and perfusion indices; Appropriate HR/Rhythm     Disposition:   Sign Out in:  PACU  Disposition:  Phase II; Home  Recovery Course: Uneventful  Follow-Up: Not required           Last Anesthesia Record Vitals:  CRNA VITALS  2019 1309 - 2019 1409      2019             Pulse:  94    SpO2:  98 %          Last PACU Vitals:  Vitals Value Taken Time   /80 2019  2:08 PM   Temp 36.5  C (97.7  F) 2019  2:08 PM   Pulse 76 2019  2:08 PM   Resp 9 2019  2:11 PM   SpO2 98 % 2019  2:11 PM   Temp src     NIBP     Pulse     SpO2     Resp     Temp     Ht Rate     Temp 2     Vitals shown include unvalidated device data.      Electronically Signed By: Bryan Appiah MD, May 2, 2019, 3:16 PM

## 2019-05-02 NOTE — BRIEF OP NOTE
[facility] Brief Operative Note    Pre-operative diagnosis: Dacryoadenitis   Post-operative diagnosis same   Procedure: Procedure(s):  Bilateral Orbitotomy and Lacrimal Gland Biopsy   Surgeon(s): Surgeon(s) and Role:     * Yuridia Owens MD - Primary   Estimated blood loss: < 10 cc   Specimens: * No specimens in log *   Findings: As expected

## 2019-05-02 NOTE — ANESTHESIA CARE TRANSFER NOTE
Patient: Liu Jackman    Procedure(s):  Bilateral Orbitotomy and Lacrimal Gland Biopsy    Diagnosis: Dacryoadenitis  Diagnosis Additional Information: No value filed.    Anesthesia Type:   No value filed.     Note:  Airway :Nasal Cannula  Patient transferred to:PACU  Comments: To PACU.   VSS.   Patent native airway, spont respirs RRR.   C/o discomfort at surgical ssite.   Report to Yissel HERNANDEZ RNHandoff Report: Identifed the Patient, Identified the Reponsible Provider, Reviewed the pertinent medical history, Discussed the surgical course, Reviewed Intra-OP anesthesia mangement and issues during anesthesia, Set expectations for post-procedure period and Allowed opportunity for questions and acknowledgement of understanding      Vitals: (Last set prior to Anesthesia Care Transfer)    CRNA VITALS  5/2/2019 1309 - 5/2/2019 1350      5/2/2019             Pulse:  94    SpO2:  98 %                Electronically Signed By: SAMUEL Gonzalez CRNA  May 2, 2019  1:50 PM

## 2019-05-03 ENCOUNTER — TELEPHONE (OUTPATIENT)
Dept: OPHTHALMOLOGY | Facility: CLINIC | Age: 24
End: 2019-05-03

## 2019-05-03 LAB
COPATH REPORT: NORMAL
COPATH REPORT: NORMAL

## 2019-05-03 NOTE — TELEPHONE ENCOUNTER
Spoke to pt [id number]  Pt states received voicemail after she called clinic back.    Pt reporting no pos-op concerns and doing well    Provided main hospital number for any new symptoms/concerns over weekend to page on call provider  Rober Ferrell RN 1:30 PM 05/03/19          [facility]    Phone Message    May a detailed message be left on voicemail: yes    Reason for Call: Other: Pt called back after we called to verify condition Post op.  please call back     Action Taken: Message routed to:  [facility] clinic

## 2019-05-03 NOTE — PROGRESS NOTES
POD1  A telephone call was made to Liu Jackman to make sure the post operative course has been uneventful and that all questions were answered. There was no answer and a telephone message was left.    Yuridia Owens

## 2019-05-07 LAB — COPATH REPORT: NORMAL

## 2019-05-08 LAB — COPATH REPORT: NORMAL

## 2019-05-14 ENCOUNTER — TELEPHONE (OUTPATIENT)
Dept: OPHTHALMOLOGY | Facility: CLINIC | Age: 24
End: 2019-05-14

## 2019-05-14 NOTE — TELEPHONE ENCOUNTER
Called patient and informed her of her negative lacrimal gland biopsy. She already started prednisone the day after surgery. She doesn't get have a f/u appointment with Dr. Chase. Will send message to get that scheduled. Also gave Liu our clinic # in case she doesn't get a call within a few days. I discussed with her that [facility] might [facility]. We will have to figure out what's going on with her vasculitis before proceeding with possible strabismus surgery.

## 2019-06-13 DIAGNOSIS — H35.063 RETINAL VASCULITIS OF BOTH EYES: Primary | ICD-10-CM

## 2019-06-19 ENCOUNTER — OFFICE VISIT (OUTPATIENT)
Dept: OPHTHALMOLOGY | Facility: CLINIC | Age: 24
End: 2019-06-19
Attending: OPHTHALMOLOGY
Payer: COMMERCIAL

## 2019-06-19 DIAGNOSIS — H35.063 RETINAL VASCULITIS OF BOTH EYES: Primary | ICD-10-CM

## 2019-06-19 PROCEDURE — 92134 CPTRZ OPH DX IMG PST SGM RTA: CPT | Mod: ZF | Performed by: OPHTHALMOLOGY

## 2019-06-19 PROCEDURE — G0463 HOSPITAL OUTPT CLINIC VISIT: HCPCS | Mod: ZF

## 2019-06-19 RX ORDER — PREDNISONE 20 MG/1
20 TABLET ORAL DAILY
Qty: 60 TABLET | Refills: 0 | Status: SHIPPED | OUTPATIENT
Start: 2019-06-19 | End: 2019-08-21

## 2019-06-19 ASSESSMENT — REFRACTION_WEARINGRX
OS_CYLINDER: SPHERE
OD_AXIS: 062
OD_SPHERE: -5.75
OS_SPHERE: -5.50
SPECS_TYPE: SVL
OD_CYLINDER: +0.75

## 2019-06-19 ASSESSMENT — CONF VISUAL FIELD
OD_NORMAL: 1
METHOD: COUNTING FINGERS
OS_NORMAL: 1

## 2019-06-19 ASSESSMENT — VISUAL ACUITY
METHOD: SNELLEN - LINEAR
OD_CC: 20/20
OS_CC+: -1
OS_CC: 20/20

## 2019-06-19 ASSESSMENT — TONOMETRY
OS_IOP_MMHG: 15
OD_IOP_MMHG: 15
IOP_METHOD: TONOPEN

## 2019-06-19 ASSESSMENT — CUP TO DISC RATIO
OD_RATIO: 0.4
OS_RATIO: 0.4

## 2019-06-19 ASSESSMENT — SLIT LAMP EXAM - LIDS
COMMENTS: NORMAL, NO LID RETRACTION
COMMENTS: NORMAL, NO LID RETRACTION

## 2019-06-19 NOTE — PROGRESS NOTES
CC: establish care for possible retinal vasculitis     Interval Update: Reports no vision changes, now s/p lacrimal biopsy on 5/2/19 ([place]), started oral prednisolone 40 mg daily on 5/3/19, on 6/12/19 she tapered to 20 mg daily. She still has diplopia which has remained unchanged since the initiation of steroids.     HPI: Liu Jackman is a  23 year old year-old patient referred by Dr Solares for evaluation and treat of possilbe vasculitis vs sarcoiditis. She was seen by Dr. 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 Though, exam revealed perivenular sheathing and candle wax drippings bilaterally. She is now s/p lacrimal gland biopsy ([place]) on 5/2/19. Biopsy results were negative for lymphoma.     Retinal Imaging:  OCT 6/19/19  RE: normal retinal layers, no IRF/SRF  LE: inner retinal hyper-reflectivity, no IRF/SRF     Optos fundus 3/29/19    RIGHT eye: consistent with exam  left eye: consistent with exam    FA 5/1/19   right eye: good fill, peripheral leakage consistent with vasculitis  Left eye: good fill, peripheral leakage consistent with vasculitis    Laboratory:  Negative: quant gold, dsDNA, lyme, Anti-nuclear antibody, treponema Abs, Myeloperoxidase AbIgG, Preteinase 3 AbIgG, chitotriosidase, ACE, lysozyme, IL2     Lacrimal gland biopsy (5/2/19): normal, no evidence of B or T cell lymphoma     Assessment & Plan:    1. Retinal vasculitis both eyes    - No recent illness    - CT chest without evidence of sarcoid    - Laboratory workup negative (see above) for infectious etiology    - Recent biopsy without evidence of lymphoma    - FA at last visit with active vasculitis both eyes    - Started Prednisone 40 mg daily on 5/3/19 without interval change     - Initial prednisone tapered to 20 mg daily since June 12   - Continue Raniditine 150 mg twice a day    - Refer to Dr. Apodaca/uveitis for further evaluation     2. Esotropia   -  possible Decompensated congenital phoria vs. 2/2 retinal vasculitis    - Follows with Dr. Pathak     3. Myopia both eyes    - Good correction (-5.5 D both eyes)   - Monitor     return to clinic: 4-6 weeks, OCT and repeat FA transit right eye        Lukas Fermin MD  Ophthalmology Resident, PGY-3  [facility]      ~~~~~~~~~~~~~~~~~~~~~~~~~~~~~~~~~~   Complete documentation of historical and exam elements from today's encounter can be found in the full encounter summary report (not reduplicated in this progress note).  I personally obtained the chief complaint(s) and history of present illness.  I confirmed and edited as necessary the review of systems, past medical/surgical history, family history, social history, and examination findings as documented by others; and I examined the patient myself.  I personally reviewed the relevant tests, images, and reports as documented above.  I personally reviewed the ophthalmic test(s) associated with this encounter, agree with the interpretation(s) as documented by the resident/fellow, and have edited the corresponding report(s) as necessary.   I formulated and edited as necessary the assessment and plan and discussed the findings and management plan with the patient and family    Pat Chase MD   of Ophthalmology.  Retina Service   Department of Ophthalmology and Visual Neurosciences   [facility]  Phone: [phone]   Fax: [phone]

## 2019-06-19 NOTE — LETTER
June 19, 2019      Re: Liu Jackman   1995    To Whom It May Concern:    This is to confirm that the above patient was seen on 6/19/2019.  Liu Jackman was accompanied by her father Skip Damian and was unable to attend work from 6-18-19 thru 6-20-19    Thank you for your cooperation in this matter.  Please do not hesitate to contact me if you have any further questions.    Sincerely,      Electronically signed  PRECIOUS SAEED

## 2019-07-24 ENCOUNTER — OFFICE VISIT (OUTPATIENT)
Dept: OPHTHALMOLOGY | Facility: CLINIC | Age: 24
End: 2019-07-24
Attending: OPHTHALMOLOGY
Payer: COMMERCIAL

## 2019-07-24 DIAGNOSIS — H35.063 RETINAL VASCULITIS OF BOTH EYES: ICD-10-CM

## 2019-07-24 PROCEDURE — 92134 CPTRZ OPH DX IMG PST SGM RTA: CPT | Mod: ZF | Performed by: OPHTHALMOLOGY

## 2019-07-24 PROCEDURE — G0463 HOSPITAL OUTPT CLINIC VISIT: HCPCS | Mod: ZF,25

## 2019-07-24 PROCEDURE — 92235 FLUORESCEIN ANGRPH MLTIFRAME: CPT | Mod: ZF | Performed by: OPHTHALMOLOGY

## 2019-07-24 ASSESSMENT — TONOMETRY
OS_IOP_MMHG: 18
OD_IOP_MMHG: 19
IOP_METHOD: TONOPEN

## 2019-07-24 ASSESSMENT — VISUAL ACUITY
OD_CC: 20/20
OD_CC+: -1
CORRECTION_TYPE: GLASSES
OS_CC: 20/20
METHOD: SNELLEN - LINEAR

## 2019-07-24 ASSESSMENT — CUP TO DISC RATIO
OS_RATIO: 0.4
OD_RATIO: 0.4

## 2019-07-24 ASSESSMENT — CONF VISUAL FIELD
OD_NORMAL: 1
METHOD: COUNTING FINGERS
OS_NORMAL: 1

## 2019-07-24 ASSESSMENT — SLIT LAMP EXAM - LIDS
COMMENTS: NORMAL, NO LID RETRACTION
COMMENTS: NORMAL, NO LID RETRACTION

## 2019-07-24 NOTE — LETTER
7/24/2019       RE: Liu Jackman  [address]  [facility] [address]     Dear Colleague,    Thank you for referring your patient, Liu Jackman, to the EYE CLINIC at [facility]. Please see a copy of my visit note below.     CC: establish care for possible retinal vasculitis     Interval Update: Reports no vision changes, now s/p lacrimal biopsy on 5/2/19 ([place]), started oral prednisolone 40 mg daily on 5/3/19, on 6/12/19 she tapered to 20 mg daily. Still on this dose. She still has diplopia which has remained unchanged since the initiation of steroids.     HPI: Liu Jackman is a  23 year old year-old patient referred by Dr Solares for evaluation and treat of possilbe vasculitis vs sarcoiditis. She was seen by Dr. 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 Though, exam revealed perivenular sheathing and candle wax drippings bilaterally. She is now s/p lacrimal gland biopsy ([place]) on 5/2/19. Biopsy results were negative for lymphoma.     Retinal Imaging:  OCT 7-24-19  RE: normal retinal layers, no IRF/SRF  LE: inner retinal hyper-reflectivity, no IRF/SRF     Optos fundus 3/29/19    RIGHT eye: consistent with exam  left eye: consistent with exam    FA 7-24-19  right eye: good fill, peripheral leakage consistent with vasculitis. Improved significantly compared to prior fluorescein angiography   Left eye: good fill, peripheral leakage consistent with vasculitis. Improved significantly compared to prior fluorescein angiography     Laboratory:  Negative: quant gold, dsDNA, lyme, Anti-nuclear antibody, treponema Abs, Myeloperoxidase AbIgG, Preteinase 3 AbIgG, chitotriosidase, ACE, lysozyme, IL2     Lacrimal gland biopsy (5/2/19): normal, no evidence of B or T cell lymphoma     Assessment & Plan:  1. Retinal vasculitis both eyes    - No recent illness    - CT chest without evidence of sarcoid    -  Laboratory workup negative (see above) for infectious etiology    - Recent biopsy without evidence of lymphoma    - FA today shows active vasculitis, with slightly decreased activity both eyes    - Started Prednisone 40 mg daily on 5/3/19 without interval change     - Initial prednisone tapered to 20 mg daily since June 12. OK to taper to 10 mg    - Continue Raniditine 150 mg twice a day    - Refer to Dr. Apodaca/uveitis for further evaluation - appt 9/20/19    2. 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 2/2 retinal vasculitis    - Follows with Dr. Pathak     3. Myopia both eyes    - Good correction (-5.5 D both eyes)   - Monitor     return to clinic: 4-6 weeks, OCT   Could consider peripheral Panretinal laser photocoagulation (PRP) in the future if vasculitis recur and after uveitis evaluation  Skip Rea MD PGY3 Ophthalmology Resident    ~~~~~~~~~~~~~~~~~~~~~~~~~~~~~~~~~~  Complete documentation of historical and exam elements from today's encounter can be found in the full encounter summary report (not reduplicated in this progress note).  I personally obtained the chief complaint(s) and history of present illness.  I confirmed and edited as necessary the review of systems, past medical/surgical history, family history, social history, and examination findings as documented by others; and I examined the patient myself.  I personally reviewed the relevant tests, images, and reports as documented above.  I personally reviewed the ophthalmic test(s) associated with this encounter, agree with the interpretation(s) as documented by the resident/fellow, and have edited the corresponding report(s) as necessary.   I formulated and edited as necessary the assessment and plan and discussed the findings and management plan with the patient and family Pat Chase MD    Again, thank you for allowing me to participate in the care of your patient.      Sincerely,    Pat Chase,  M.D.   of Ophthalmology  Vitreoretinal Surgeon  [facility] Chair  Department of Ophthalmology & Visual Neurosciences  [facility]  Phone:  [phone]   Fax:  [phone]

## 2019-07-24 NOTE — PROGRESS NOTES
CC: establish care for possible retinal vasculitis     Interval Update: Reports no vision changes, now s/p lacrimal biopsy on 5/2/19 ([facility]iela), started oral prednisolone 40 mg daily on 5/3/19, on 6/12/19 she tapered to 20 mg daily. Still on this dose. She still has diplopia which has remained unchanged since the initiation of steroids.     HPI: Liu Jackman is a  23 year old year-old patient referred by Dr Solares for evaluation and treat of possilbe vasculitis vs sarcoiditis. She was seen by Dr. 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 Though, exam revealed perivenular sheathing and candle wax drippings bilaterally. She is now s/p lacrimal gland biopsy ([place]) on 5/2/19. Biopsy results were negative for lymphoma.     Retinal Imaging:  OCT 7-24-19  RE: normal retinal layers, no IRF/SRF  LE: inner retinal hyper-reflectivity, no IRF/SRF     Optos fundus 3/29/19    RIGHT eye: consistent with exam  left eye: consistent with exam    FA 7-24-19  right eye: good fill, peripheral leakage consistent with vasculitis. Improved significantly compared to prior fluorescein angiography   Left eye: good fill, peripheral leakage consistent with vasculitis. Improved significantly compared to prior fluorescein angiography     Laboratory:  Negative: quant gold, dsDNA, lyme, Anti-nuclear antibody, treponema Abs, Myeloperoxidase AbIgG, Preteinase 3 AbIgG, chitotriosidase, ACE, lysozyme, IL2     Lacrimal gland biopsy (5/2/19): normal, no evidence of B or T cell lymphoma     Assessment & Plan:    1. Retinal vasculitis both eyes    - No recent illness    - CT chest without evidence of sarcoid    - Laboratory workup negative (see above) for infectious etiology    - Recent biopsy without evidence of lymphoma    - FA today shows active vasculitis, with slightly decreased activity both eyes    - Started Prednisone 40 mg daily on 5/3/19 without interval change     -  Initial prednisone tapered to 20 mg daily since June 12. OK to taper to 10 mg    - Continue Raniditine 150 mg twice a day    - Refer to Dr. Apodaca/uveitis for further evaluation - appt 9/20/19    2. 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 2/2 retinal vasculitis    - Follows with Dr. Pathak     3. Myopia both eyes    - Good correction (-5.5 D both eyes)   - Monitor     return to clinic: 4-6 weeks, OCT   Could consider peripheral Panretinal laser photocoagulation (PRP) in the future if vasculitis recur and after uveitis evaluation      Skip Rea MD PGY3 Ophthalmology Resident    ~~~~~~~~~~~~~~~~~~~~~~~~~~~~~~~~~~   Complete documentation of historical and exam elements from today's encounter can be found in the full encounter summary report (not reduplicated in this progress note).  I personally obtained the chief complaint(s) and history of present illness.  I confirmed and edited as necessary the review of systems, past medical/surgical history, family history, social history, and examination findings as documented by others; and I examined the patient myself.  I personally reviewed the relevant tests, images, and reports as documented above.  I personally reviewed the ophthalmic test(s) associated with this encounter, agree with the interpretation(s) as documented by the resident/fellow, and have edited the corresponding report(s) as necessary.   I formulated and edited as necessary the assessment and plan and discussed the findings and management plan with the patient and family    Pat Chase MD   of Ophthalmology.  Retina Service   Department of Ophthalmology and Visual Neurosciences   [facility]  Phone: [phone]   Fax: [phone]

## 2019-08-21 ENCOUNTER — OFFICE VISIT (OUTPATIENT)
Dept: OPHTHALMOLOGY | Facility: CLINIC | Age: 24
End: 2019-08-21
Attending: OPHTHALMOLOGY
Payer: COMMERCIAL

## 2019-08-21 DIAGNOSIS — H35.063 RETINAL VASCULITIS OF BOTH EYES: Primary | ICD-10-CM

## 2019-08-21 PROCEDURE — G0463 HOSPITAL OUTPT CLINIC VISIT: HCPCS | Mod: ZF

## 2019-08-21 PROCEDURE — 92134 CPTRZ OPH DX IMG PST SGM RTA: CPT | Mod: ZF | Performed by: OPHTHALMOLOGY

## 2019-08-21 RX ORDER — PREDNISONE 20 MG/1
10 TABLET ORAL DAILY
Qty: 60 TABLET | Refills: 1 | Status: SHIPPED | OUTPATIENT
Start: 2019-08-21

## 2019-08-21 ASSESSMENT — VISUAL ACUITY
OD_CC: 20/20
METHOD: SNELLEN - LINEAR
OS_CC: 20/20
CORRECTION_TYPE: GLASSES

## 2019-08-21 ASSESSMENT — CUP TO DISC RATIO
OD_RATIO: 0.4
OS_RATIO: 0.4

## 2019-08-21 ASSESSMENT — REFRACTION_WEARINGRX
OS_SPHERE: -5.50
OD_AXIS: 062
OD_CYLINDER: +0.75
OS_CYLINDER: SPHERE
SPECS_TYPE: SVL
OD_SPHERE: -5.75

## 2019-08-21 ASSESSMENT — TONOMETRY
IOP_METHOD: ICARE
OD_IOP_MMHG: 17
OS_IOP_MMHG: 17

## 2019-08-21 ASSESSMENT — CONF VISUAL FIELD
OD_NORMAL: 1
OS_NORMAL: 1

## 2019-08-21 ASSESSMENT — SLIT LAMP EXAM - LIDS
COMMENTS: NORMAL, NO LID RETRACTION
COMMENTS: NORMAL, NO LID RETRACTION

## 2019-08-21 NOTE — LETTER
August 21, 2019      Re: Liu Jackman   1995    To Whom It May Concern:    This is to confirm that the above patient was seen on 8/21/2019.  Liu Jackman is able to return to work 8/23/19. Her father accompanied her to her visit. His presence was necessary to transport the patient to and from her visit, as well as to assist in her post-visit care.     Thank you for your cooperation in this matter.  Please do not hesitate to contact me if you have any further questions.    Sincerely,      PRECIOUS SAEED

## 2019-08-21 NOTE — NURSING NOTE
Chief Complaints and History of Present Illnesses   Patient presents with     Retinal Vasculitis Follow Up     Chief Complaint(s) and History of Present Illness(es)     Retinal Vasculitis Follow Up     Laterality: both eyes    Onset: 1 month ago    Pain scale: 1/10              Comments     Pt. States that there has been no change in VA BE.  Has been feeling a lot more eye strain which is causing some pain NICK Patrick COT 8:26 AM August 21, 2019

## 2019-08-21 NOTE — PROGRESS NOTES
CC: establish care for possible retinal vasculitis     Interval Update: Reports no vision changes, now s/p lacrimal biopsy on 5/2/19 ([place]iela), started oral prednisolone 40 mg daily on 5/3/19, on 6/12/19 she tapered to 20 mg daily. Still on this dose. She still has diplopia which has remained unchanged since the initiation of steroids.     HPI: Liu Jackman is a  23 year old year-old patient referred by Dr Solares for evaluation and treat of possilbe vasculitis vs sarcoiditis. She was seen by Dr. 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 Though, exam revealed perivenular sheathing and candle wax drippings bilaterally. She is now s/p lacrimal gland biopsy ([place]) on 5/2/19. Biopsy results were negative for lymphoma.     Retinal Imaging:  OCT 8-21-19  RE: normal retinal layers, no IRF/SRF  LE: inner retinal hyper-reflectivity, no IRF/SRF     Optos fundus 3/29/19    RIGHT eye: consistent with exam  left eye: consistent with exam    FA 7-24-19  right eye: good fill, peripheral leakage consistent with vasculitis. Improved significantly compared to prior fluorescein angiography   Left eye: good fill, peripheral leakage consistent with vasculitis. Improved significantly compared to prior fluorescein angiography     Laboratory:  Negative: quant gold, dsDNA, lyme, Anti-nuclear antibody, treponema Abs, Myeloperoxidase AbIgG, Preteinase 3 AbIgG, chitotriosidase, ACE, lysozyme, IL2     Lacrimal gland biopsy (5/2/19): normal, no evidence of B or T cell lymphoma     Assessment & Plan:    1. Retinal vasculitis both eyes    - No recent illness    - CT chest without evidence of sarcoid    - Laboratory workup negative (see above) for infectious etiology    - Recent biopsy without evidence of lymphoma    - FA today shows active vasculitis, with slightly decreased activity both eyes    - Started Prednisone 40 mg daily on 5/3/19 without interval change     -  Initial prednisone tapered to 20 mg daily since June 12. OK to taper to 10 mg    - Continue Raniditine 150 mg twice a day    - Refer to Dr. Apodaca/uveitis for further evaluation - appt 9/20/19    2. 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 2/2 retinal vasculitis    - Follows with Dr. Pathak     3. Myopia both eyes    - Good correction (-5.5 D both eyes)   - Monitor     return to clinic: 6 weeks, OCT   Could consider peripheral Panretinal laser photocoagulation (PRP) in the future if vasculitis recur and after uveitis evaluation      Skip Rea MD PGY3 Ophthalmology Resident    ~~~~~~~~~~~~~~~~~~~~~~~~~~~~~~~~~~   Complete documentation of historical and exam elements from today's encounter can be found in the full encounter summary report (not reduplicated in this progress note).  I personally obtained the chief complaint(s) and history of present illness.  I confirmed and edited as necessary the review of systems, past medical/surgical history, family history, social history, and examination findings as documented by others; and I examined the patient myself.  I personally reviewed the relevant tests, images, and reports as documented above.  I personally reviewed the ophthalmic test(s) associated with this encounter, agree with the interpretation(s) as documented by the resident/fellow, and have edited the corresponding report(s) as necessary.   I formulated and edited as necessary the assessment and plan and discussed the findings and management plan with the patient and family    Pat Chase MD   of Ophthalmology.  Retina Service   Department of Ophthalmology and Visual Neurosciences   [facility]  Phone: [phone]   Fax: [phone]

## 2019-09-19 DIAGNOSIS — H35.063 RETINAL VASCULITIS OF BOTH EYES: Primary | ICD-10-CM

## 2019-09-20 ENCOUNTER — OFFICE VISIT (OUTPATIENT)
Dept: OPHTHALMOLOGY | Facility: CLINIC | Age: 24
End: 2019-09-20
Attending: OPHTHALMOLOGY
Payer: COMMERCIAL

## 2019-09-20 DIAGNOSIS — H35.063 RETINAL VASCULITIS OF BOTH EYES: Primary | ICD-10-CM

## 2019-09-20 DIAGNOSIS — H35.063 RETINAL VASCULITIS OF BOTH EYES: ICD-10-CM

## 2019-09-20 DIAGNOSIS — H53.2 DOUBLE VISION: ICD-10-CM

## 2019-09-20 PROBLEM — H57.89 THYROID EYE DISEASE: Status: ACTIVE | Noted: 2019-03-11

## 2019-09-20 PROBLEM — E07.9 THYROID EYE DISEASE: Status: ACTIVE | Noted: 2019-03-11

## 2019-09-20 LAB
ALBUMIN UR-MCNC: NEGATIVE MG/DL
APPEARANCE UR: ABNORMAL
BACTERIA #/AREA URNS HPF: ABNORMAL /HPF
BILIRUB UR QL STRIP: NEGATIVE
COLOR UR AUTO: YELLOW
ERYTHROCYTE [SEDIMENTATION RATE] IN BLOOD BY WESTERGREN METHOD: 5 MM/H (ref 0–20)
GLUCOSE UR STRIP-MCNC: NEGATIVE MG/DL
HGB UR QL STRIP: ABNORMAL
KETONES UR STRIP-MCNC: NEGATIVE MG/DL
LEUKOCYTE ESTERASE UR QL STRIP: ABNORMAL
MUCOUS THREADS #/AREA URNS LPF: PRESENT /LPF
NITRATE UR QL: NEGATIVE
PH UR STRIP: 6 PH (ref 5–7)
RBC #/AREA URNS AUTO: 11 /HPF (ref 0–2)
SOURCE: ABNORMAL
SP GR UR STRIP: 1.01 (ref 1–1.03)
SQUAMOUS #/AREA URNS AUTO: 4 /HPF (ref 0–1)
UROBILINOGEN UR STRIP-MCNC: 0 MG/DL (ref 0–2)
WBC #/AREA URNS AUTO: 8 /HPF (ref 0–5)

## 2019-09-20 PROCEDURE — 92133 CPTRZD OPH DX IMG PST SGM ON: CPT | Mod: ZF | Performed by: OPHTHALMOLOGY

## 2019-09-20 PROCEDURE — G0463 HOSPITAL OUTPT CLINIC VISIT: HCPCS | Mod: ZF

## 2019-09-20 PROCEDURE — 92134 CPTRZ OPH DX IMG PST SGM RTA: CPT | Mod: ZF | Performed by: OPHTHALMOLOGY

## 2019-09-20 RX ORDER — PREDNISONE 5 MG/1
TABLET ORAL
Qty: 21 TABLET | Refills: 0 | Status: SHIPPED | OUTPATIENT
Start: 2019-09-20 | End: 2019-10-18

## 2019-09-20 ASSESSMENT — REFRACTION_WEARINGRX
OD_SPHERE: -5.75
SPECS_TYPE: SVL
OD_AXIS: 062
OS_CYLINDER: SPHERE
OS_SPHERE: -5.50
OD_CYLINDER: +0.75

## 2019-09-20 ASSESSMENT — VISUAL ACUITY
CORRECTION_TYPE: GLASSES
OS_CC: 20/20
OD_CC: 20/20
METHOD: SNELLEN - LINEAR

## 2019-09-20 ASSESSMENT — TONOMETRY
OD_IOP_MMHG: 16
IOP_METHOD: ICARE
OS_IOP_MMHG: 19

## 2019-09-20 ASSESSMENT — CUP TO DISC RATIO
OS_RATIO: 0.5
OD_RATIO: 0.5

## 2019-09-20 ASSESSMENT — CONF VISUAL FIELD
OD_NORMAL: 1
OS_NORMAL: 1

## 2019-09-20 NOTE — LETTER
September 20, 2019      Re: Liu Jackman   1995    To Whom It May Concern:    This is to confirm that the above patient was seen on 9/20/2019.  Please excuse Skip Jackman Sr from work on Thursday Sept 19th and Friday Sept 20th.      Thank you for your cooperation in this matter.  Please do not hesitate to contact me if you have any further questions.    Sincerely,      ARPAN HOGAN

## 2019-09-20 NOTE — Clinical Note
Would you mind calling patient on Monday and asking her if she has any burning with urination or urinary urgency? UA suggests she may have a UTI but if asymptomatic no need to treat. If symptomatic please let me know and I will call in a medication for her. Thanks, Dena

## 2019-09-20 NOTE — Clinical Note
Mazin Curry, I wonder what you think about the possibility of multiple sclerosis in this patient - I think it unlikely given the present information but am interested in your opinion in this patient with uveitis and diplopia. I know she already had CNS imaging and review of systems is not suggestive - I assume that is sufficient? Also, she is scheduled to see me in follow up Nov 8th; perhaps she could see you for the esotropia that day as well since it remains her main concern. Thanks, Dena

## 2019-09-20 NOTE — Clinical Note
Mazin Curry, I wonder what you think about the possibility of multiple sclerosis in this patient - I think it unlikely given the present information but am interested in your opinion in this patient with uveitis and diplopia. She is scheduled to see me in follow up Nov 8th; perhaps she could see you for the esotropia that day as well since it remains her main concern. Thanks, Dena

## 2019-09-20 NOTE — PROGRESS NOTES
HPI: Liu Jackman is a 23 year old  female referred by Dr. Pat Chase for evaluation of retinal vasculitis. Ms. Rickie Jackman's main vision concern remains the constant, binocular diplopia, which has not changed with prednisone treatment. Patient denies any eye pain except for intermittent headaches that she attributes to eye strain related to diplopia (these tend to happen after prolonged reading). No eye redness. No blurry vision; she states that her monocular vision is excellent. No photophobia. No prior episodes of eye redness, photophobia, blurry vision.    Current ocular medications: oral prednisone 10 mg/day (started at 40 mg/day in May 2019 and has tapered) with ranitidine.  Prior ocular medications: none.    Ocular history:   1. Retinal vasculitis both eyes, incidentally found on March 2019 exam.  2. 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 Pathak most recently 5.1.19. Diplopia started December 2017.  3. Bilateral lacrimal gland enlargement s/p biopsy (5.2.19, [facility]aciela)  4. Moderate myopia both eyes, started wearing glasses age 11-12 years.  5. Negative for ocular injury, laser, surgery.    Medical history: unremarkable.    Review of systems: negative for fatigue, weight loss, fevers, chills, night sweats, frequent headaches, seizure, fainting, numbness/tingling, focal weakness, swallowing difficulty, oral ulcers, genital ulcers, pathergy reaction, recurrent nosebleeds, sinusitis, hearing loss, tinnitus, chronic cough, chest pain, shortness of breath, skin rash, tick bite, easy bruising, easy bleeding, joint pain/stiffness, back pain, recurrent diarrhea, bloody stools, bloody urine.    Family history: Positive for thyroid cancer (mother). Negative for ocular disease, autoimmune disease, diabetes mellitus.    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 Never smoked, no alcohol use, no IV drug use. No pets currently but previously had a dog and a cat. She has never lived outside the [place].    Laboratory/Imaging Results:  5.2.19 lacrimal gland biopsy was normal.  3.29.19 chest CT showed no findings suggestive of sarcoidosis.  3.29.19 Quantiferon-Tb, Treponema Ab, Lyme, ACE, lysozyme, chitotriosidase, soluble IL2R, anti-dsDNA, anti-MPO, anti-Pro3, and complete metabolic panel (CMP) all normal/negative. Anti-nuclear antibody borderline positive at 1:40, speckled. Complete blood count (CBC) without diff was normal except for slightly low WBC ct (3.9, normal 4.0-11.0).    10.19.18 MRI face/neck/orbit report: \"No infarction, mass, abnormal enhancement or white matter signal changes in the brain.\"  Per Dr. Pathak's 3.29.19 note: \"I do not see that the medial rectus muscles are any more enlarged than other recti muscles. All muscles look possibly borderline enlarged but this is not definitive in my mind and could still be physiologic.  The lacrimal glands bilaterally also looked possibly mildly enlarged bilaterally.\"    Ocular Imaging:  Macular OCT 9.20.19: within normal limits both eyes, stable vs 8.21.19. Note that there has been no meaningful change in retinal thickness in either eye with corticosteroid treatment. MARK also normal both eyes.    Retinal nerve fiber layer OCT 9.20.19: within normal limits both eyes. No change 5.1.19 (just prior to prednisone) -> 9.20.19.    Prior studies review:  Octopus glaucoma top 5.1.19: no defects both eyes.  Optos fluorescein angiography 5.1.19 (pretreatment): late peripheral leakage most pronounced inferiorly both eyes and nasally right eye. No macular or optic nerve head leakage.    Impression/Plan:  1. Idiopathic retinal vasculitis both eyes, no associated cystoid macular edema, disc edema, cataract, posterior synechiae, or other worrisome inflammatory sequelae. Asymptomatic. Patient has had an extensive workup, " focusing on sarcoidosis, that has been negative to date. The differential diagnosis also includes multiple sclerosis (more of a consideration given strabismus but concomitant esotropia is not one of the more typical patterns, review of systems was not suggestive, and outside brain MRI noted no white matter signal change), infectious (but Tb, syphilis, Lyme testing all was negative - will obtain HIV given reports of it causing a frosted branch angiitis type picture), Behcet's (but review of systems was not suggestive), and other systemic vasculitis (creatinine, ANCA testing was normal, will obtain ESR/UA for screening).   - HIV, ESR, UA to complete basic retinal vasculitis workup.   - Taper off prednisone: 5 mg/day for 2 weeks then 2.5 mg/day for 2 weeks then stop; may stop ranitidine (restart if chest discomfort).   - Re-evaluate off prednisone; based on initial fluorescein angiography I doubt that she will require systemic IMT at this time.    2. Diplopia 2/2 acquired esotropia.   - Follow up with Dr. Pathak; no contraindication to strabismus surgery from a uveitis perspective.    Return to uveitis clinic in 6-7 weeks (this will be 2-3 weeks off prednisone): V/T/D/mac OCT/Optos photos with inferior sweeps/Optos fluorescein angiography transit right eye    Attending Physician Attestation:  Complete documentation of historical and exam elements from today's encounter can be found in the full encounter summary report (not reduplicated in this progress note).  I personally obtained the chief complaint(s) and history of present illness.  I confirmed and edited as necessary the review of systems, past medical/surgical history, family history, social history, and examination findings as documented by others; and I examined the patient myself.  I personally reviewed the relevant tests, images, and reports as documented above.  I formulated and edited as necessary the assessment and plan and discussed the findings and  management plan with the patient and family.

## 2019-09-21 LAB — HIV 1+2 AB+HIV1 P24 AG SERPL QL IA: NONREACTIVE

## 2019-09-21 ASSESSMENT — SLIT LAMP EXAM - LIDS
COMMENTS: NORMAL
COMMENTS: NORMAL

## 2019-11-08 ENCOUNTER — OFFICE VISIT (OUTPATIENT)
Dept: OPHTHALMOLOGY | Facility: CLINIC | Age: 24
End: 2019-11-08
Attending: OPHTHALMOLOGY
Payer: COMMERCIAL

## 2019-11-08 ENCOUNTER — PREP FOR PROCEDURE (OUTPATIENT)
Dept: OPHTHALMOLOGY | Facility: CLINIC | Age: 24
End: 2019-11-08

## 2019-11-08 DIAGNOSIS — H53.2 DOUBLE VISION: Primary | ICD-10-CM

## 2019-11-08 DIAGNOSIS — H35.063 RETINAL VASCULITIS OF BOTH EYES: Primary | ICD-10-CM

## 2019-11-08 DIAGNOSIS — H53.10 SUBJECTIVE VISUAL DISTURBANCE: ICD-10-CM

## 2019-11-08 DIAGNOSIS — H50.05 ESOTROPIA, ALTERNATING: ICD-10-CM

## 2019-11-08 PROCEDURE — 92060 SENSORIMOTOR EXAMINATION: CPT | Mod: ZF | Performed by: OPHTHALMOLOGY

## 2019-11-08 PROCEDURE — 92250 FUNDUS PHOTOGRAPHY W/I&R: CPT | Mod: ZF | Performed by: OPHTHALMOLOGY

## 2019-11-08 PROCEDURE — G0463 HOSPITAL OUTPT CLINIC VISIT: HCPCS | Mod: ZF | Performed by: TECHNICIAN/TECHNOLOGIST

## 2019-11-08 PROCEDURE — 92235 FLUORESCEIN ANGRPH MLTIFRAME: CPT | Mod: ZF | Performed by: OPHTHALMOLOGY

## 2019-11-08 PROCEDURE — 92134 CPTRZ OPH DX IMG PST SGM RTA: CPT | Mod: ZF | Performed by: OPHTHALMOLOGY

## 2019-11-08 ASSESSMENT — CONF VISUAL FIELD
OD_NORMAL: 1
OS_NORMAL: 1
METHOD: COUNTING FINGERS

## 2019-11-08 ASSESSMENT — VISUAL ACUITY
METHOD: SNELLEN - LINEAR
OD_CC+: -2
OS_CC+: -2
OS_CC: 20/20
OD_CC: 20/20
CORRECTION_TYPE: GLASSES

## 2019-11-08 ASSESSMENT — REFRACTION_WEARINGRX
SPECS_TYPE: SVL
OD_AXIS: 062
OD_SPHERE: -5.75
OS_CYLINDER: SPHERE
OS_SPHERE: -5.50
OD_CYLINDER: +0.75

## 2019-11-08 ASSESSMENT — TONOMETRY
OD_IOP_MMHG: 22
IOP_METHOD: ICARE
OS_IOP_MMHG: 21

## 2019-11-08 NOTE — PROGRESS NOTES
HPI: Liu Jackman is a 23 year old  female here for 6 week follow up of retinal vasculitis with imaging following prednisone taper. She has been off oral prednisone since 10.14.19. She reports no changes in vision with oral prednisone taper or cessation (she also noted no change in vision when starting oral prednisone. Her primary ocular complaint continues to be constant, binocular diplopia. She saw Dr. Pathak earlier today and scheduled strabismus surgery for next month.    Current ocular medications: none.  Prior ocular medications: oral prednisone (started at 40 mg/day in May 2019 and tapered, stopped 10.14.19) + ranitidine.    Ocular history:   1. Retinal vasculitis both eyes, incidentally found on March 2019 exam.  2. 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 Pathak. Diplopia started December 2017.  3. Bilateral lacrimal gland enlargement s/p biopsy (5.2.19, Graciela)  4. Moderate myopia both eyes, started wearing glasses age 11-12 years.  5. Negative for ocular injury, laser, surgery.    Medical history: unremarkable.    Family history: Positive for thyroid cancer (mother). Negative for ocular disease, autoimmune disease, diabetes mellitus.    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 Never smoked, no alcohol use, no IV drug use. No pets currently but previously had a dog and a cat. She has never lived outside the [place].    Laboratory/Imaging Results:  9.20.19 HIV and ESR (5) both normal/negative. UA showed mild abnormalities (but patient did not have UTI symptoms); no proteinuria on UA.  5.2.19 lacrimal gland biopsy was normal.  3.29.19 chest CT showed no findings suggestive of sarcoidosis.  3.29.19 Quantiferon-Tb, Treponema Ab, Lyme, ACE, lysozyme, chitotriosidase, soluble IL2R, anti-dsDNA, anti-MPO, anti-Pro3, and complete metabolic panel (CMP) all  "normal/negative. Anti-nuclear antibody borderline positive at 1:40, speckled. Complete blood count (CBC) without diff was normal except for slightly low WBC ct (3.9, normal 4.0-11.0).    10.19.18 MRI face/neck/orbit report: \"No infarction, mass, abnormal enhancement or white matter signal changes in the brain.\"  Per Dr. Pathak's 3.29.19 note: \"I do not see that the medial rectus muscles are any more enlarged than other recti muscles. All muscles look possibly borderline enlarged but this is not definitive in my mind and could still be physiologic.  The lacrimal glands bilaterally also looked possibly mildly enlarged bilaterally.\"    Ocular Imaging:  Macular OCT 11.8.19: within normal limits both eyes, stable vs 9.20.19. Note that there has been no meaningful change in retinal thickness in either eye with corticosteroid treatment. MARK also normal both eyes.    Retinal nerve fiber layer OCT 11.8.19: within normal limits both eyes. No change right eye 5.1.19 (just prior to prednisone) -> 9.20.19 (during prednisone) -> 11.8.19 (off prednisone). There was mild decrease in nerve fiber layer thickness right eye in TS quadrant with prednisone; will monitor.    Optos fluorescein angiography 11.8.19:  Right eye: few focal venuolar dilatations nasally, no leakage (but inferior sweeps not done so difficult to compare to prior - viewed areas appear similar to prior).  Left eye: minimal late peripheral leakage; no macular or optic nerve head leakage. Again, inferior sweeps not done so difficult to compare to prior.    Prior studies review:  Octopus glaucoma top 5.1.19: no defects both eyes.  Optos fluorescein angiography 5.1.19 (pretreatment): late peripheral leakage most pronounced inferiorly both eyes and nasally right eye. No macular or optic nerve head leakage.    Impression/Plan:  1. Idiopathic retinal vasculitis both eyes, no associated cystoid macular edema, disc edema, cataract, posterior synechiae, or other worrisome " inflammatory sequelae. Asymptomatic. Patient has had an extensive workup, focusing on sarcoidosis, that has been negative to date. Multiple sclerosis is unlikely (patient's strabismus pattern is atypical for multiple sclerosis-associated strabismus, review of systems was not suggestive, and outside brain MRI noted no white matter signal change). Evaluation negative for infection (Tb, syphilis, Lyme, HIV testing all negative), Behcet's (review of systems was not suggestive), and other systemic vasculitis (creatinine/ESR/ANCA testing was normal). Today there is no active inflammation on examination and only minimal peripheral leakage on fluorescein angiography.   - Continue off prednisone.   - No need for systemic IMT at this time.    2. Diplopia 2/2 acquired esotropia.   - Follow up with Dr. Pathak; no contraindication to strabismus surgery from a uveitis perspective.    Return to uveitis clinic in 3-4 months, try to combine with post-op visit for strabismus surgery: V/T/D/mac OCT/Optos photos with inferior sweeps/Optos fluorescein angiography with inferior sweeps transit right eye    Attending Physician Attestation:  Complete documentation of historical and exam elements from today's encounter can be found in the full encounter summary report (not reduplicated in this progress note).  I personally obtained the chief complaint(s) and history of present illness.  I confirmed and edited as necessary the review of systems, past medical/surgical history, family history, social history, and examination findings as documented by others; and I examined the patient myself.  I personally reviewed the relevant tests, images, and reports as documented above.  I formulated and edited as necessary the assessment and plan and discussed the findings and management plan with the patient and family.

## 2019-11-08 NOTE — NURSING NOTE
Chief Complaint(s) and History of Present Illness(es)     Follow Up     In both eyes (Strabismus follow up, esotropia).              Comments     Patient states eye misalignment is stable, not getting worse and no improvement. Reports diagonal double vision that is stable, not getting worse.   No previous eye muscle surgery.    MAGAN Bullard 11/8/2019 10:48 AM

## 2019-11-08 NOTE — PROGRESS NOTES
1. Comitant esotropia - stable measurements today.  After no improvement in strabismus with corticosteroids and with the input from Dr. Apodaca, I don't believe that the patient's esotropia relates to her retinal findings. Sensorimotor exam today stable.  Discussed the risks, benefits, and alternatives of strabismus surgery and patient wishes to proceed.    2. Retinal vasculitis- patient followed by Dr. Apodaca who does not feel that treatment is required at this time.     - For a more thorough description of the disease presentation, please see my letter from the patient's initial visit with me    Patient states no changes in vision    Dr. Apodaca does not feel that retinal vascular changes are pathologic or requiring long-term immunesuppression. She does not see a contraindication to strabismus surgery.  Patient is highly interested in strabismus surgery for reconstructive and potentially functional purposes.     Sensorimotor exam today stable with stable comitant esotropia.    We discussed in detail the risks, benefits, and alternatives of eye muscle correction surgery including the very rare risk of death or serious morbidity from a general anesthesia complication and the rare risk of severe vision loss in the operative eye(s) secondary to retinal detachment or endophthalmitis.  We discussed more likely sub-optimal outcomes including the unanticipated need for additional strabismus surgery.  Finally the patient was aware that prisms glasses may be required to optimize single vision following surgery.    After a thorough discussion of these risks, the patient decided to proceed with strabismus surgery.  The surgical plan is as follows:     Bilateral eye muscle correction ( probable bilateral lateral rectus resection)    Follow-up 1 week after strabismus surgery.    Plan to [facility] at: [facility]  Prism free glasses for adjustment: Non adjustable- no prism in glasses.       Complete documentation of historical  and exam elements from today's encounter can be found in the full encounter summary report (not reduplicated in this progress note).  I personally obtained the chief complaint(s) and history of present illness.  I confirmed and edited as necessary the review of systems, past medical/surgical history, family history, social history, and examination findings as documented by others; and I examined the patient myself.  I personally reviewed the relevant tests, images, and reports as documented above.  I formulated and edited as necessary the assessment and plan and discussed the findings and management plan with the patient and family     Patricio Pathak MD

## 2019-11-08 NOTE — Clinical Note
Mazin Curry, I agree with having you proceed with strabismus surgery. No need for additional rony-operative medications from a uveitis perspective. Thanks, Dena

## 2019-11-08 NOTE — LETTER
November 8, 2019      Re: Liu Jackman   1995    To Whom It May Concern:    This is to confirm that the above patient was seen on 11/8/2019.  Please excuse Skip Aguilera from work on Thursday Nov 7 and Friday Nov 8th.   Thank you for your cooperation in this matter.  Please do not hesitate to contact me if you have any further questions.    Sincerely,      ARPAN HOGAN

## 2019-11-10 ASSESSMENT — SLIT LAMP EXAM - LIDS
COMMENTS: NORMAL
COMMENTS: NORMAL

## 2019-11-10 ASSESSMENT — CUP TO DISC RATIO
OD_RATIO: 0.5
OS_RATIO: 0.5

## 2019-11-10 ASSESSMENT — VISUAL ACUITY
METHOD: SNELLEN - LINEAR
OS_CC: 20/20
OS_CC+: -2
OD_CC: 20/20
OD_CC+: -2
CORRECTION_TYPE: GLASSES

## 2019-11-10 ASSESSMENT — TONOMETRY
OS_IOP_MMHG: 21
OD_IOP_MMHG: 22

## 2019-11-10 ASSESSMENT — EXTERNAL EXAM - RIGHT EYE: OD_EXAM: NORMAL

## 2019-11-10 ASSESSMENT — EXTERNAL EXAM - LEFT EYE: OS_EXAM: NORMAL

## 2019-11-17 ASSESSMENT — SLIT LAMP EXAM - LIDS
COMMENTS: NORMAL
COMMENTS: NORMAL

## 2019-11-17 ASSESSMENT — EXTERNAL EXAM - RIGHT EYE: OD_EXAM: NORMAL

## 2019-11-17 ASSESSMENT — EXTERNAL EXAM - LEFT EYE: OS_EXAM: NORMAL

## 2019-11-19 ENCOUNTER — TELEPHONE (OUTPATIENT)
Dept: OPHTHALMOLOGY | Facility: CLINIC | Age: 24
End: 2019-11-19

## 2019-11-19 NOTE — TELEPHONE ENCOUNTER
[facility] Call Center    Phone Message    May a detailed message be left on voicemail: yes    Reason for Call: Other: Pt's father called and said that they would like to move their 12/09 appt to 11/26.  If you have any questions or concerns, please reach out to the pt's father. Thanks     Action Taken: Message routed to:  [facility] ([facility] eye clinic

## 2019-11-21 ENCOUNTER — TELEPHONE (OUTPATIENT)
Dept: OPHTHALMOLOGY | Facility: CLINIC | Age: 24
End: 2019-11-21

## 2019-11-22 NOTE — TELEPHONE ENCOUNTER
11/21/2019 6:24PM Liu states she is seeking financial assistance for travel costs, etc for her 11/26/2019 strabismus surgery. She would like a letter from Dr. Pathak with her clinic appointment dates and surgery date faxed to: [phone]

## 2019-11-25 ENCOUNTER — ANESTHESIA EVENT (OUTPATIENT)
Dept: SURGERY | Facility: AMBULATORY SURGERY CENTER | Age: 24
End: 2019-11-25

## 2019-11-26 ENCOUNTER — ANESTHESIA (OUTPATIENT)
Dept: SURGERY | Facility: AMBULATORY SURGERY CENTER | Age: 24
End: 2019-11-26

## 2019-11-26 ENCOUNTER — HOSPITAL ENCOUNTER (OUTPATIENT)
Facility: AMBULATORY SURGERY CENTER | Age: 24
End: 2019-11-26
Attending: OPHTHALMOLOGY
Payer: COMMERCIAL

## 2019-11-26 VITALS
DIASTOLIC BLOOD PRESSURE: 67 MMHG | WEIGHT: 190 LBS | TEMPERATURE: 98 F | HEIGHT: 66 IN | HEART RATE: 65 BPM | OXYGEN SATURATION: 96 % | RESPIRATION RATE: 16 BRPM | SYSTOLIC BLOOD PRESSURE: 125 MMHG | BODY MASS INDEX: 30.53 KG/M2

## 2019-11-26 DIAGNOSIS — H53.2 DOUBLE VISION: ICD-10-CM

## 2019-11-26 DIAGNOSIS — H53.2 DOUBLE VISION: Primary | ICD-10-CM

## 2019-11-26 LAB
HCG UR QL: NEGATIVE
INTERNAL QC OK POCT: YES

## 2019-11-26 RX ORDER — ACETAMINOPHEN 325 MG/1
975 TABLET ORAL ONCE
Status: COMPLETED | OUTPATIENT
Start: 2019-11-26 | End: 2019-11-26

## 2019-11-26 RX ORDER — SODIUM CHLORIDE, SODIUM LACTATE, POTASSIUM CHLORIDE, CALCIUM CHLORIDE 600; 310; 30; 20 MG/100ML; MG/100ML; MG/100ML; MG/100ML
INJECTION, SOLUTION INTRAVENOUS CONTINUOUS
Status: DISCONTINUED | OUTPATIENT
Start: 2019-11-26 | End: 2019-11-26 | Stop reason: HOSPADM

## 2019-11-26 RX ORDER — NALOXONE HYDROCHLORIDE 0.4 MG/ML
.1-.4 INJECTION, SOLUTION INTRAMUSCULAR; INTRAVENOUS; SUBCUTANEOUS
Status: DISCONTINUED | OUTPATIENT
Start: 2019-11-26 | End: 2019-11-27 | Stop reason: HOSPADM

## 2019-11-26 RX ORDER — ONDANSETRON 2 MG/ML
INJECTION INTRAMUSCULAR; INTRAVENOUS PRN
Status: DISCONTINUED | OUTPATIENT
Start: 2019-11-26 | End: 2019-11-26

## 2019-11-26 RX ORDER — FENTANYL CITRATE 50 UG/ML
25-50 INJECTION, SOLUTION INTRAMUSCULAR; INTRAVENOUS
Status: DISCONTINUED | OUTPATIENT
Start: 2019-11-26 | End: 2019-11-26 | Stop reason: HOSPADM

## 2019-11-26 RX ORDER — PROPOFOL 10 MG/ML
INJECTION, EMULSION INTRAVENOUS PRN
Status: DISCONTINUED | OUTPATIENT
Start: 2019-11-26 | End: 2019-11-26

## 2019-11-26 RX ORDER — OXYCODONE HYDROCHLORIDE 5 MG/1
5 TABLET ORAL ONCE
Status: COMPLETED | OUTPATIENT
Start: 2019-11-26 | End: 2019-11-26

## 2019-11-26 RX ORDER — DEXAMETHASONE SODIUM PHOSPHATE 4 MG/ML
INJECTION, SOLUTION INTRA-ARTICULAR; INTRALESIONAL; INTRAMUSCULAR; INTRAVENOUS; SOFT TISSUE PRN
Status: DISCONTINUED | OUTPATIENT
Start: 2019-11-26 | End: 2019-11-26

## 2019-11-26 RX ORDER — PROPOFOL 10 MG/ML
INJECTION, EMULSION INTRAVENOUS CONTINUOUS PRN
Status: DISCONTINUED | OUTPATIENT
Start: 2019-11-26 | End: 2019-11-26

## 2019-11-26 RX ORDER — BALANCED SALT SOLUTION 6.4; .75; .48; .3; 3.9; 1.7 MG/ML; MG/ML; MG/ML; MG/ML; MG/ML; MG/ML
SOLUTION OPHTHALMIC PRN
Status: DISCONTINUED | OUTPATIENT
Start: 2019-11-26 | End: 2019-11-26 | Stop reason: HOSPADM

## 2019-11-26 RX ORDER — LIDOCAINE 40 MG/G
CREAM TOPICAL
Status: DISCONTINUED | OUTPATIENT
Start: 2019-11-26 | End: 2019-11-26 | Stop reason: HOSPADM

## 2019-11-26 RX ORDER — SODIUM CHLORIDE, SODIUM LACTATE, POTASSIUM CHLORIDE, CALCIUM CHLORIDE 600; 310; 30; 20 MG/100ML; MG/100ML; MG/100ML; MG/100ML
INJECTION, SOLUTION INTRAVENOUS CONTINUOUS
Status: DISCONTINUED | OUTPATIENT
Start: 2019-11-26 | End: 2019-11-27 | Stop reason: HOSPADM

## 2019-11-26 RX ORDER — OXYMETAZOLINE HYDROCHLORIDE 0.05 G/100ML
SPRAY NASAL PRN
Status: DISCONTINUED | OUTPATIENT
Start: 2019-11-26 | End: 2019-11-26 | Stop reason: HOSPADM

## 2019-11-26 RX ORDER — MEPERIDINE HYDROCHLORIDE 25 MG/ML
12.5 INJECTION INTRAMUSCULAR; INTRAVENOUS; SUBCUTANEOUS
Status: DISCONTINUED | OUTPATIENT
Start: 2019-11-26 | End: 2019-11-27 | Stop reason: HOSPADM

## 2019-11-26 RX ORDER — GABAPENTIN 300 MG/1
300 CAPSULE ORAL ONCE
Status: COMPLETED | OUTPATIENT
Start: 2019-11-26 | End: 2019-11-26

## 2019-11-26 RX ORDER — LIDOCAINE HYDROCHLORIDE 20 MG/ML
INJECTION, SOLUTION INFILTRATION; PERINEURAL PRN
Status: DISCONTINUED | OUTPATIENT
Start: 2019-11-26 | End: 2019-11-26

## 2019-11-26 RX ORDER — ONDANSETRON 4 MG/1
4 TABLET, ORALLY DISINTEGRATING ORAL EVERY 30 MIN PRN
Status: DISCONTINUED | OUTPATIENT
Start: 2019-11-26 | End: 2019-11-27 | Stop reason: HOSPADM

## 2019-11-26 RX ORDER — FENTANYL CITRATE 50 UG/ML
INJECTION, SOLUTION INTRAMUSCULAR; INTRAVENOUS PRN
Status: DISCONTINUED | OUTPATIENT
Start: 2019-11-26 | End: 2019-11-26

## 2019-11-26 RX ORDER — ONDANSETRON 2 MG/ML
4 INJECTION INTRAMUSCULAR; INTRAVENOUS EVERY 30 MIN PRN
Status: DISCONTINUED | OUTPATIENT
Start: 2019-11-26 | End: 2019-11-27 | Stop reason: HOSPADM

## 2019-11-26 RX ORDER — PREDNISOLONE ACETATE 10 MG/ML
SUSPENSION/ DROPS OPHTHALMIC
Qty: 10 ML | Refills: 0 | Status: SHIPPED | OUTPATIENT
Start: 2019-11-26

## 2019-11-26 RX ADMIN — PROPOFOL 200 MG: 10 INJECTION, EMULSION INTRAVENOUS at 11:15

## 2019-11-26 RX ADMIN — LIDOCAINE HYDROCHLORIDE 100 MG: 20 INJECTION, SOLUTION INFILTRATION; PERINEURAL at 11:15

## 2019-11-26 RX ADMIN — FENTANYL CITRATE 50 MCG: 50 INJECTION, SOLUTION INTRAMUSCULAR; INTRAVENOUS at 11:19

## 2019-11-26 RX ADMIN — OXYCODONE HYDROCHLORIDE 5 MG: 5 TABLET ORAL at 12:26

## 2019-11-26 RX ADMIN — FENTANYL CITRATE 25 MCG: 50 INJECTION, SOLUTION INTRAMUSCULAR; INTRAVENOUS at 12:16

## 2019-11-26 RX ADMIN — DEXAMETHASONE SODIUM PHOSPHATE 4 MG: 4 INJECTION, SOLUTION INTRA-ARTICULAR; INTRALESIONAL; INTRAMUSCULAR; INTRAVENOUS; SOFT TISSUE at 11:15

## 2019-11-26 RX ADMIN — FENTANYL CITRATE 50 MCG: 50 INJECTION, SOLUTION INTRAMUSCULAR; INTRAVENOUS at 11:51

## 2019-11-26 RX ADMIN — SODIUM CHLORIDE, SODIUM LACTATE, POTASSIUM CHLORIDE, CALCIUM CHLORIDE: 600; 310; 30; 20 INJECTION, SOLUTION INTRAVENOUS at 10:14

## 2019-11-26 RX ADMIN — ACETAMINOPHEN 975 MG: 325 TABLET ORAL at 10:13

## 2019-11-26 RX ADMIN — ONDANSETRON 4 MG: 2 INJECTION INTRAMUSCULAR; INTRAVENOUS at 11:15

## 2019-11-26 RX ADMIN — PROPOFOL 200 MCG/KG/MIN: 10 INJECTION, EMULSION INTRAVENOUS at 11:15

## 2019-11-26 RX ADMIN — GABAPENTIN 300 MG: 300 CAPSULE ORAL at 10:13

## 2019-11-26 ASSESSMENT — MIFFLIN-ST. JEOR: SCORE: 1638.58

## 2019-11-26 NOTE — DISCHARGE INSTRUCTIONS
[facility]  Home Care Following Anesthesia  For 24 hours after surgery:  1. Get plenty of rest.  A responsible adult must stay with you for at least 24 hours after you leave the surgery center.  2. Do not drive or use heavy equipment.  If you have weakness or tingling, don't drive or use heavy equipment until this feeling goes away.   3. Do not drink alcohol.   4. Avoid strenuous or risky activities.  Ask for help when climbing stairs.  5. You may feel lightheaded.  IF so, sit for a few minutes before standing.  Have someone help you get up.   6. If you have nausea (feel sick to your stomach): Drink only clear liquids such as apple juice, ginger ale, broth or 7-Up.  Rest may also help.  Be sure to drink enough fluids.  Move to a regular diet as you feel able.   7. You may have a slight fever.  Call the doctor if your fever is over 100 F (37.7 C) (taken under the tongue) or lasts longer than 24 hours.  8. You may have a dry mouth, a sore throat, muscle aches or trouble sleeping. These should go away after 24 hours.  9. Do not make important or legal decisions.               Tips for taking pain medications  To get the best pain relief possible, remember these points:    Take pain medications as directed, before pain becomes severe.    Pain medication can upset your stomach: taking it with food may help.    Constipation is a common side effect of pain medication. Drink plenty of  fluids.    Eat foods high in fiber. Take a stool softener if recommended by your doctor or pharmacist.    Do not drink alcohol, drive or operate machinery while taking pain medications.    Ask about other ways to control pain, such as with heat, ice or relaxation.    Tylenol/Acetaminophen Consumption: 975 mg tylenol taken at 10:15PM, Ok to take additional tylenol after 4:15PM. Follow package instructions.  To help encourage the safe use of acetaminophen, the makers of TYLENOL  have lowered the maximum  daily dose for single-ingredient Extra Strength TYLENOL  (acetaminophen) products sold in the [place] from 8 pills per day (4,000 mg) to 6 pills per day (3,000 mg). The dosing interval has also changed from 2 pills every 4-6 hours to 2 pills every 6 hours.    If you feel your pain relief is insufficient, you may take Tylenol/Acetaminophen in addition to your narcotic pain medication.     Be careful not to exceed 3,000 mg of Tylenol/Acetaminophen in a 24 hour period from all sources.    If you are taking extra strength Tylenol/acetaminophen (500 mg), the maximum dose is 6 tablets in 24 hours.    If you are taking regular strength acetaminophen (325 mg), the maximum dose is 9 tablets in 24 hours.  Last dose of Tylenol:  975 mg at 10:13 am.  Next dose after 4:13 pm, if needed.  Follow package instructions.    Call a doctor for any of the followin. Signs of infection (fever, growing tenderness at the surgery site, a large amount of drainage or bleeding, severe pain, foul-smelling drainage, redness, swelling).  2. It has been over 8 to 10 hours since surgery and you are still not able to urinate (pass water).  3. Headache for over 24 hours.  Your doctor is:       Dr. Patricio Pathak, Ophthalmology: [phone]               Or dial [phone] and ask for the resident on call for:  Ophthalmology  For emergency care, call the:  [place] [facility]:  [phone] (TTY for hearing impaired: [phone])

## 2019-11-26 NOTE — ANESTHESIA POSTPROCEDURE EVALUATION
Anesthesia POST Procedure Evaluation    Patient: Liu Jackman   MRN:     [id number] Gender:   female   Age:    23 year old :      1995        Preoperative Diagnosis: Double vision [H53.2]   Procedure(s):  BILATERAL EYE MUSCLE CORRECTION   Postop Comments: No value filed.       Anesthesia Type:  Not documented  No value filed.    Reportable Event: NO     PAIN: Uncomplicated   Sign Out status: Comfortable, Well controlled pain     PONV: No PONV   Sign Out status:  No Nausea or Vomiting     Neuro/Psych: Uneventful perioperative course   Sign Out Status: Preoperative baseline; Age appropriate mentation     Airway/Resp.: Uneventful perioperative course   Sign Out Status: Non labored breathing, age appropriate RR; Resp. Status within EXPECTED Parameters     CV: Uneventful perioperative course   Sign Out status: Appropriate BP and perfusion indices; Appropriate HR/Rhythm     Disposition:   Sign Out in:  PACU  Disposition:  Phase II; Home  Recovery Course: Uneventful  Follow-Up: Not required           Last Anesthesia Record Vitals:  CRNA VITALS  2019 1137 - 2019 1237      2019             Resp Rate (observed):  (!) 1    Resp Rate (set):  10          Last PACU Vitals:  Vitals Value Taken Time   /75 2019 12:30 PM   Temp 36.6  C (97.8  F) 2019 12:30 PM   Pulse 68 2019 12:30 PM   Resp 11 2019 12:30 PM   SpO2 95 % 2019 12:30 PM   Temp src     NIBP     Pulse     SpO2     Resp     Temp     Ht Rate     Temp 2     Vitals shown include unvalidated device data.      Electronically Signed By: Raffi Napier MD, MD, 2019, 12:50 PM

## 2019-11-26 NOTE — ANESTHESIA CARE TRANSFER NOTE
Patient: Liu Jackman    Procedure(s):  BILATERAL EYE MUSCLE CORRECTION    Diagnosis: Double vision [H53.2]  Diagnosis Additional Information: No value filed.    Anesthesia Type:   No value filed.     Note:  Airway :Room Air  Patient transferred to:PACU  Comments: Rosario Report: Identifed the Patient, Identified the Reponsible Provider, Reviewed the pertinent medical history, Discussed the surgical course, Reviewed Intra-OP anesthesia mangement and issues during anesthesia, Set expectations for post-procedure period and Allowed opportunity for questions and acknowledgement of understanding      Vitals: (Last set prior to Anesthesia Care Transfer)    CRNA VITALS  11/26/2019 1137 - 11/26/2019 1211      11/26/2019             Resp Rate (observed):  (!) 1    Resp Rate (set):  10                Electronically Signed By: SAMUEL Gamble CRNA  November 26, 2019  12:11 PM

## 2019-11-26 NOTE — BRIEF OP NOTE
[facility] Brief Operative Note    Pre-operative diagnosis: Strabismus    Post-operative diagnosis Same   Procedure: Procedure(s):  BILATERAL EYE MUSCLE CORRECTION   Surgeon: Particio Chapa MD   Assistants(s): None   Estimated blood loss: Less than 1 cc    Specimens: None   Findings: See full operative report

## 2019-11-26 NOTE — OP NOTE
"ATTENDING SURGEON:  Patricio Pathak MD    DATE OF PROCEDURE:  November 26, 2019    FIRST SURGICAL ASSISTANT:  None    ANESTHESIA:  General anesthesia    PREOPERATIVE DIAGNOSIS (ES):  1. Large angle esotropia     POSTOPERATIVE DIAGNOSIS (ES):  Same    NAME OF OPERATION:  1. Right lateral rectus resection 8.5 mm  2. Left lateral rectus resection 8.5 mm    INDICATIONS FOR PROCEDURE:    The patient is a pleasant 23 year old with what ultimately we felt was decompensation of an underlying congenital strabismus.  On serial sensorimotor exams she had a stable comitant esotropia with full motility including normal abduction in both eyes.  She underwent MRI that failed to reveal a central etiology and underwent a steroid trial for unrelated retinal vasculitis which failed to change her esotropia.  Her uveitis specialist ultimately felt that her retinal vasculitis in the far periphery did not require long-term immunosuppression and clear the patient for strabismus surgery. Patient was eager for strabismus surgery to re-align the eyes    I warned the patient about the risks, benefits, and alternatives of eye muscle surgery including a relatively small risk for severe infection, retinal detachment, and permanent vision loss of the eye.  I also discussed the possibility of postoperative double vision.  I discussed the possibility that due to postoperative double vision or a postoperative recurrent strabismus, the patient may require additional strabismus procedures in the future.  Understanding these risks, the patient decided to proceed with strabismus surgery.      DESCRIPTION OF PROCEDURE:    After the risks, benefits, and alternatives of eye muscle surgery were discussed with the patient and the patient's questions were answered both in clinic and on the day of surgery, written informed consent was obtained and witnessed in the preoperative holding area on the day of surgery.  The word \"yes\" was written over both eyes " indicating that the patient consented to eye muscle correction in both eyes.  An intravenous line was placed and light intravenous sedation was given.  The patient was transported to the operating room where after appropriate monitors were placed, the patient underwent induction of general anesthesia without complication.      Both eyes were prepped and draped in the usual sterile fashion for ophthalmic surgery and a lid speculum was placed in the right eye.  Forced duction testing in this eye revealed no restriction.  A trapezoidal temporal conjunctival flap was created using conjunctival forceps and Lucretia scissors.  This was reflected backwards to expose the right lateral rectus muscle which was isolated using a Alejandro muscle hook.  The muscle was freed from Tenon's capsule with blunt dissection using a Lucretia scissors and cotton swab.  A double armed 6-0 Vicryl suture was then woven across the width of the muscle at a point measured to be 8.5 mm posterior to the insertion and tied to the edges.  A hemostat straight clamp was then clamped perpendicular to the muscle just anterior to the suture and the distal 8.0 mm muscle segment was excised with a Lucretia scissors and 0.3 forceps.  Both arms of the 6-0 Vicryl suture were then passed partial thickness through the sclera in a crossing swords technique at the physiologic insertion site.  At this point, the ends of the suture were tied together tightly advancing the muscle and completing a resection effect of 8.5 mm.  The needles were cut off and the ends were cut short.  The conjunctival flap was then re-opposed in the surgical bed and held in place using two 6-0 plain gut sutures.  The lid speculum was removed from the operative eye.     A lid speculum was placed in the left eye.  Forced duction testing in this eye revealed no restriction.  A trapezoidal temporal conjunctival flap was created using conjunctival forceps and Lucretia scissors.  This was  reflected backwards to expose the left lateral rectus muscle which was isolated using a [place] muscle hook.  The muscle was freed from Tenon's capsule with blunt dissection using a Lucretia scissors and cotton swab.  A double armed 6-0 Vicryl suture was then woven across the width of the muscle at a point measured to be 8.5 mm posterior to the insertion and tied to the edges.  A hemostat straight clamp was then clamped perpendicular to the muscle just anterior to the suture and the distal 8.0 mm muscle segment was excised with a Lucretia scissors and 0.3 forceps.  Both arms of the 6-0 Vicryl suture were then passed partial thickness through the sclera in a crossing swords technique at the physiologic insertion site.  At this point, the ends of the suture were tied together tightly advancing the muscle and completing a resection effect of 8.5 mm.  The needles were cut off and the ends were cut short.  The conjunctival flap was then re-opposed in the surgical bed and held in place using two 6-0 plain gut sutures.  The lid speculum was removed from the operative eye.     Maxitrol ointment was placed in both eyes.  The patient was awakened from general anesthesia without complication and discharged to the recovery room in stable condition.       SPECIMENS REMOVED:  None.      ESTIMATED BLOOD LOSS:  1 mL or less.    INTRAOPERATIVE FLUIDS:  As per anesthesia records.      SPONGE/INSTRUMENT/NEEDLE COUNTS:  All sponge, instrument, and needle counts were correct times two.    CONDITION ON DISCHARGE FROM OPERATING ROOM:  Stable.      COMPLICATIONS:  None.     I was present for the entire procedure

## 2019-11-26 NOTE — ANESTHESIA PREPROCEDURE EVALUATION
Anesthesia Pre-Procedure Evaluation    Patient: Liu Jackman   MRN:     [id number] Gender:   female   Age:    23 year old :      1995        Preoperative Diagnosis: Double vision [H53.2]   Procedure(s):  BILATERAL EYE MUSCLE CORRECTION     History reviewed. No pertinent past medical history.   Past Surgical History:   Procedure Laterality Date     ORBITOTOMY Bilateral 2019    Procedure: Bilateral Orbitotomy and Lacrimal Gland Biopsy;  Surgeon: Yuridia Owens MD;  Location: [facility] OR          Anesthesia Evaluation     . Pt has had prior anesthetic. Type: General    No history of anesthetic complications          ROS/MED HX    ENT/Pulmonary:  - neg pulmonary ROS     Neurologic:  - neg neurologic ROS     Cardiovascular:  - neg cardiovascular ROS       METS/Exercise Tolerance:  4 - Raking leaves, gardening   Hematologic:  - neg hematologic  ROS       Musculoskeletal:  - neg musculoskeletal ROS       GI/Hepatic:  - neg GI/hepatic ROS       Renal/Genitourinary:  - ROS Renal section negative       Endo:  - neg endo ROS       Psychiatric:  - neg psychiatric ROS       Infectious Disease:  - neg infectious disease ROS       Malignancy:      - no malignancy   Other:                         PHYSICAL EXAM:   Mental Status/Neuro: A/A/O   Airway: Facies: Feasible  Mallampati: I  Mouth/Opening: Full  TM distance: > 6 cm  Neck ROM: Full   Respiratory: Auscultation: CTAB     Resp. Rate: Normal     Resp. Effort: Normal      CV: Rhythm: Regular  Rate: Age appropriate  Heart: Normal Sounds  Edema: None   Comments:      Dental: Normal Dentition                LABS:  CBC:   Lab Results   Component Value Date    WBC 3.9 (L) 2019    HGB 13.8 2019    HCT 43.1 2019     2019     BMP:   Lab Results   Component Value Date     2019    POTASSIUM 3.9 2019    CHLORIDE 105 2019    CO2 25 2019    BUN 11 2019    CR 0.61 2019    GLC 90 2019     COAGS: No  "results found for: PTT, INR, FIBR  POC:   Lab Results   Component Value Date    HCG Negative 11/26/2019     OTHER:   Lab Results   Component Value Date    NICKOLAS 9.1 03/29/2019    ALBUMIN 4.4 03/29/2019    PROTTOTAL 7.9 03/29/2019    ALT 18 03/29/2019    AST 12 03/29/2019    ALKPHOS 68 03/29/2019    BILITOTAL 0.5 03/29/2019    SED 5 09/20/2019        Preop Vitals    BP Readings from Last 3 Encounters:   11/26/19 118/71   05/02/19 124/79    Pulse Readings from Last 3 Encounters:   11/26/19 65   05/02/19 59      Resp Readings from Last 3 Encounters:   11/26/19 16   05/02/19 16    SpO2 Readings from Last 3 Encounters:   11/26/19 96%   05/02/19 98%      Temp Readings from Last 1 Encounters:   11/26/19 36.8  C (98.2  F) (Oral)    Ht Readings from Last 1 Encounters:   11/26/19 1.676 m (5' 6\")      Wt Readings from Last 1 Encounters:   11/26/19 86.2 kg (190 lb)    Estimated body mass index is 30.67 kg/m  as calculated from the following:    Height as of this encounter: 1.676 m (5' 6\").    Weight as of this encounter: 86.2 kg (190 lb).     LDA:  Peripheral IV 03/29/19 Right (Active)   Number of days: 242       Peripheral IV 05/02/19 Right Hand (Active)   Number of days: 208       Peripheral IV 11/26/19 Right Hand (Active)   Site Assessment WDL 11/26/2019 10:12 AM   Line Status Infusing 11/26/2019 10:12 AM   Phlebitis Scale 0-->no symptoms 11/26/2019 10:12 AM   Infiltration Scale 0 11/26/2019 10:12 AM   Infiltration Site Treatment Method  None 11/26/2019 10:12 AM   Extravasation? No 11/26/2019 10:12 AM   Dressing Intervention New dressing  11/26/2019 10:12 AM   Number of days: 0       Airway - Adult/Peds 4 laryngeal mask airway (Active)   Number of days: 0        Assessment:   ASA SCORE: 1    H&P: History and physical reviewed and following examination; no interval change.   Smoking Status:  Non-Smoker/Unknown   NPO Status: NPO Appropriate     Plan:   Anes. Type:  General   Pre-Medication: None   Induction:  IV (Standard) "   Airway: LMA   Access/Monitoring: PIV   Maintenance: TIVA     Postop Plan:   Postop Pain: Opioids  Postop Sedation/Airway: Not planned  Disposition: Outpatient     PONV Management:   Adult Risk Factors: Female, Non-Smoker, Postop Opioids   Prevention: Ondansetron, Dexamethasone, No Volatiles     CONSENT: Direct conversation   Plan and risks discussed with: Patient                      Raffi Napier MD, MD

## 2019-12-04 ENCOUNTER — OFFICE VISIT (OUTPATIENT)
Dept: OPHTHALMOLOGY | Facility: CLINIC | Age: 24
End: 2019-12-04
Attending: OPHTHALMOLOGY
Payer: COMMERCIAL

## 2019-12-04 DIAGNOSIS — H50.05 ESOTROPIA, ALTERNATING: Primary | ICD-10-CM

## 2019-12-04 PROCEDURE — G0463 HOSPITAL OUTPT CLINIC VISIT: HCPCS | Mod: ZF | Performed by: TECHNICIAN/TECHNOLOGIST

## 2019-12-04 ASSESSMENT — VISUAL ACUITY
OS_CC: 20/40
CORRECTION_TYPE: GLASSES
OD_CC+: -2
OD_CC: 20/25
METHOD: SNELLEN - LINEAR

## 2019-12-04 ASSESSMENT — REFRACTION_WEARINGRX
OS_CYLINDER: SPHERE
OS_SPHERE: -5.50
OD_AXIS: 062
OD_CYLINDER: +0.75
SPECS_TYPE: SVL
OD_SPHERE: -5.75

## 2019-12-04 ASSESSMENT — TONOMETRY
IOP_METHOD: ICARE
OD_IOP_MMHG: 16
OS_IOP_MMHG: 19

## 2019-12-04 ASSESSMENT — SLIT LAMP EXAM - LIDS
COMMENTS: NORMAL
COMMENTS: NORMAL

## 2019-12-04 NOTE — NURSING NOTE
Chief Complaint(s) and History of Present Illness(es)     Follow Up     In both eyes (Post op).              Comments     -Surgery 11/26/2019:  1. Right lateral rectus resection 8.5 mm  2. Left lateral rectus resection 8.5 mm    Patient states she is feeling fine. No double vision.   Doing nithya as directed.     MAGAN Bullard 12/4/2019 2:18 PM

## 2019-12-04 NOTE — PROGRESS NOTES
1. 1 week post-op status post strabismus surgery:     -Surgery 11/26/19:  1. Right lateral rectus resection 8.5 mm  2. Left lateral rectus resection 8.5 mm    - Alignment: Ortho at distance; 4 E(T) at near  - Diplopia: None  - Healing up appropriately  - Maxitrol ophthalmic ointment: stop  - Start Predforte 1% four times a day in the operative eye(s) and decrease by one drop daily every week.  Course will complete in 1 month.    Return to clinic in 3 months or sooner as needed.       Complete documentation of historical and exam elements from today's encounter can be found in the full encounter summary report (not reduplicated in this progress note).  I personally obtained the chief complaint(s) and history of present illness.  I confirmed and edited as necessary the review of systems, past medical/surgical history, family history, social history, and examination findings as documented by others; and I examined the patient myself.  I personally reviewed the relevant tests, images, and reports as documented above.  I formulated and edited as necessary the assessment and plan and discussed the findings and management plan with the patient and family.  I personally reviewed the ophthalmic test(s) associated with this encounter, agree with the interpretation(s) as documented by the resident/fellow, and have edited the corresponding report(s) as necessary.     MD Skip Deluna MD  Neuro-Ophthalmology Fellow

## 2019-12-04 NOTE — LETTER
December 4, 2019      Re: Liu Jackman   1995    To Whom It May Concern:    This is to confirm that the above patient was seen on 12/4/2019. Skip Jackman Sr. Needed to provide transport for this medically necessary appointment. Please excuse him from work for 12/3/2019, 12/4/2019, and 12/5/2019.     Thank you for your cooperation in this matter.  Please do not hesitate to contact me if you have any further questions.    Sincerely,          Patricio Pathak MD  , Neuro-ophthalmology and Adult Strabismus  Department of Ophthalmology and Visual Neurosciences

## 2020-05-27 ENCOUNTER — TELEPHONE (OUTPATIENT)
Dept: OPHTHALMOLOGY | Facility: CLINIC | Age: 25
End: 2020-05-27

## 2020-07-15 ENCOUNTER — TELEPHONE (OUTPATIENT)
Dept: OPHTHALMOLOGY | Facility: CLINIC | Age: 25
End: 2020-07-15

## 2020-07-15 NOTE — TELEPHONE ENCOUNTER
Pt's previous appointments were cancelled secondary to COVID    Pt has f/u appt with Dr. Apodaca July 24th.    Pt lives in [facility] (about 10-12 hours away)    Pt not on any eye drops and no oral medications    No eye symptoms and no double vision since surgery with Dr. Chapa    Pt would like to review if necessary to travel for visit    Stated would ask Dr. Apodaca to review plan-- OK for virtual visit vs in clinic vs. F/u locally if stable    Rober Ferrell RN 3:07 PM 07/15/20

## 2021-04-25 ENCOUNTER — HEALTH MAINTENANCE LETTER (OUTPATIENT)
Age: 26
End: 2021-04-25

## 2021-09-10 ENCOUNTER — OFFICE VISIT (OUTPATIENT)
Dept: OPHTHALMOLOGY | Facility: CLINIC | Age: 26
End: 2021-09-10
Attending: OPHTHALMOLOGY
Payer: COMMERCIAL

## 2021-09-10 DIAGNOSIS — H53.2 DOUBLE VISION: ICD-10-CM

## 2021-09-10 DIAGNOSIS — H35.063 RETINAL VASCULITIS OF BOTH EYES: Primary | ICD-10-CM

## 2021-09-10 PROCEDURE — G0463 HOSPITAL OUTPT CLINIC VISIT: HCPCS | Mod: 25

## 2021-09-10 PROCEDURE — 92134 CPTRZ OPH DX IMG PST SGM RTA: CPT | Performed by: OPHTHALMOLOGY

## 2021-09-10 PROCEDURE — 99213 OFFICE O/P EST LOW 20 MIN: CPT | Performed by: OPHTHALMOLOGY

## 2021-09-10 PROCEDURE — 92235 FLUORESCEIN ANGRPH MLTIFRAME: CPT | Performed by: OPHTHALMOLOGY

## 2021-09-10 ASSESSMENT — REFRACTION_WEARINGRX
OD_SPHERE: -5.75
OS_SPHERE: -5.50
OS_CYLINDER: SPHERE
OD_AXIS: 062
SPECS_TYPE: SVL
OD_CYLINDER: +0.75

## 2021-09-10 ASSESSMENT — VISUAL ACUITY
OS_CC: 20/25
OD_CC: 20/20
CORRECTION_TYPE: GLASSES
METHOD: SNELLEN - LINEAR
OS_CC+: -2
OD_CC+: -2

## 2021-09-10 ASSESSMENT — CONF VISUAL FIELD
OD_NORMAL: 1
OS_NORMAL: 1
METHOD: COUNTING FINGERS

## 2021-09-10 ASSESSMENT — CUP TO DISC RATIO
OS_RATIO: 0.5
OD_RATIO: 0.5

## 2021-09-10 ASSESSMENT — EXTERNAL EXAM - RIGHT EYE: OD_EXAM: NORMAL

## 2021-09-10 ASSESSMENT — SLIT LAMP EXAM - LIDS
COMMENTS: NORMAL
COMMENTS: NORMAL

## 2021-09-10 ASSESSMENT — TONOMETRY
IOP_METHOD: TONOPEN
OD_IOP_MMHG: 16
OS_IOP_MMHG: 18

## 2021-09-10 ASSESSMENT — EXTERNAL EXAM - LEFT EYE: OS_EXAM: NORMAL

## 2021-09-10 NOTE — NURSING NOTE
Chief Complaints and History of Present Illnesses   Patient presents with     Follow Up     Retinal vasculitis of both eyes     Chief Complaint(s) and History of Present Illness(es)     Follow Up     Laterality: both eyes    Course: stable    Associated symptoms: Negative for eye pain, headache, flashes and floaters    Comments: Retinal vasculitis of both eyes              Comments     States va is the same since last visit.   Pt says she has no new concerns.  Pt is not using any drops at this time.    Patient with a history of a hematologic condition which may cause delays when ordering RBCs.

## 2021-09-10 NOTE — PROGRESS NOTES
"HPI: Liu Jackman is a 25 year old  female here for 1.75 year follow up (extended 2/2/ COVID-19 pandemic) of asymptomatic retinal vasculitis. She reports that her vision is good, no flashes/floaters/photophobia. Her diplopia has resolved since undergoing strabismus surgery in fall 2019.    Current ocular medications: none.  Prior ocular medications: oral prednisone (started at 40 mg/day in May 2019 and tapered, stopped 10.14.19) + ranitidine.    Ocular history:   1. Retinal vasculitis both eyes, incidentally found on March 2019 exam.  2. 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 Pathak. Diplopia started December 2017.   - S/p bilateral lateral rectus resection (11.26.19, [facility])  3. Bilateral lacrimal gland enlargement s/p biopsy (5.2.19, [facility])  4. Moderate myopia both eyes, started wearing glasses age 11-12 years.  5. Negative for ocular injury, laser, surgery.    Medical history: unremarkable.    Family history: Positive for thyroid cancer (mother). Negative for ocular disease, autoimmune disease, diabetes mellitus.    Laboratory/Imaging Results:  9.20.19 HIV and ESR (5) both normal/negative. UA showed mild abnormalities (but patient did not have UTI symptoms); no proteinuria on UA.  5.2.19 lacrimal gland biopsy was normal.  3.29.19 chest CT showed no findings suggestive of sarcoidosis.  3.29.19 Quantiferon-Tb, Treponema Ab, Lyme, ACE, lysozyme, chitotriosidase, soluble IL2R, anti-dsDNA, anti-MPO, anti-Pro3, and complete metabolic panel (CMP) all normal/negative. Anti-nuclear antibody borderline positive at 1:40, speckled. Complete blood count (CBC) without diff was normal except for slightly low WBC ct (3.9, normal 4.0-11.0).    10.19.18 MRI face/neck/orbit report: \"No infarction, mass, abnormal enhancement or white matter signal changes in the brain.\"  Per Dr. Pathak's 3.29.19 note: " "\"I do not see that the medial rectus muscles are any more enlarged than other recti muscles. All muscles look possibly borderline enlarged but this is not definitive in my mind and could still be physiologic.  The lacrimal glands bilaterally also looked possibly mildly enlarged bilaterally.\"    Ocular Imaging:  Macular OCT 9.10.21: within normal limits both eyes, stable vs 11.8.19.  Macular OCT 11.8.19: within normal limits both eyes, stable vs 9.20.19. Note that there was been no meaningful change in retinal thickness in either eye with corticosteroid treatment. MARK also normal both eyes.    Optos fluorescein angiography 9.10.21:  Right eye: few focal venuolar dilatations nasally, minimal peripheral leakage  Left eye: mild late peripheral leakage; no macular or optic nerve head leakage    Retinal nerve fiber layer OCT 11.8.19: within normal limits both eyes. No change right eye 5.1.19 (just prior to prednisone) -> 9.20.19 (during prednisone) -> 11.8.19 (off prednisone). There was mild decrease in nerve fiber layer thickness right eye in TS quadrant with prednisone; will monitor.    Octopus glaucoma top 5.1.19: no defects both eyes.    Impression/Plan:  1. Idiopathic retinal vasculitis both eyes, no associated cystoid macular edema, disc edema, cataract, posterior synechiae, or other worrisome inflammatory sequelae. Asymptomatic and very mild. No evidence for progression over the last 2 years. Patient has had an extensive workup, focusing on sarcoidosis, that has been negative to date. Multiple sclerosis is unlikely (patient's strabismus pattern is atypical for multiple sclerosis-associated strabismus, review of systems was not suggestive, and outside brain MRI noted no white matter signal change). Evaluation negative for infection (Tb, syphilis, Lyme, HIV testing all negative), Behcet's (review of systems was not suggestive), and other systemic vasculitis (creatinine/ESR/ANCA testing was normal). Today there is no " active inflammation on examination and only minimal late peripheral leakage on fluorescein angiography.   - Treatment not indicated.   - I recommend that patient have regular eye exams q1-2 years locally. She may return to uveitis clinic if needed.    2. Acquired esotropia. Diplopia resolved s/p strabismus surgery.   - Monitor    Return to uveitis clinic as needed.    Attending Physician Attestation:  Complete documentation of historical and exam elements from today's encounter can be found in the full encounter summary report (not reduplicated in this progress note).  I personally obtained the chief complaint(s) and history of present illness.  I confirmed and edited as necessary the review of systems, past medical/surgical history, family history, social history, and examination findings as documented by others; and I examined the patient myself.  I personally reviewed the relevant tests, images, and reports as documented above.  I formulated and edited as necessary the assessment and plan and discussed the findings and management plan with the patient and family.

## 2021-10-10 ENCOUNTER — HEALTH MAINTENANCE LETTER (OUTPATIENT)
Age: 26
End: 2021-10-10

## 2022-05-21 ENCOUNTER — HEALTH MAINTENANCE LETTER (OUTPATIENT)
Age: 27
End: 2022-05-21

## 2022-09-18 ENCOUNTER — HEALTH MAINTENANCE LETTER (OUTPATIENT)
Age: 27
End: 2022-09-18

## 2023-06-04 ENCOUNTER — HEALTH MAINTENANCE LETTER (OUTPATIENT)
Age: 28
End: 2023-06-04

## 2024-07-14 ENCOUNTER — HEALTH MAINTENANCE LETTER (OUTPATIENT)
Age: 29
End: 2024-07-14

## (undated) DEVICE — TUBING SUCTION 12"X1/4" N612

## (undated) DEVICE — LINEN TOWEL PACK X5 5464

## (undated) DEVICE — GLOVE PROTEXIS MICRO 7.5  2D73PM75

## (undated) DEVICE — SOL WATER IRRIG 500ML BOTTLE 2F7113

## (undated) DEVICE — PACK MINOR EYE CUSTOM ASC

## (undated) DEVICE — SOL NACL 0.9% INJ 1000ML BAG 2B1324X

## (undated) DEVICE — SPONGE SURGIFOAM 100 1974

## (undated) DEVICE — NDL 25GA 2"  8881200441

## (undated) DEVICE — ESU PENCIL W/COATED BLADE E2450H

## (undated) DEVICE — SU VICRYL 6-0 S-14DA 18" UND J670G

## (undated) DEVICE — SUCTION MANIFOLD NEPTUNE 2 SYS 1 PORT 702-025-000

## (undated) DEVICE — EYE PREP BETADINE 5% SOLUTION 30ML 0065-0411-30

## (undated) DEVICE — SPONGE COTTONOID 1/2X3" 80-1407

## (undated) DEVICE — SYR 03ML LL W/O NDL 309657

## (undated) DEVICE — NDL 30GA 0.5" 305106

## (undated) DEVICE — BLADE KNIFE SURG 10 371110

## (undated) DEVICE — ESU GROUND PAD ADULT W/CORD E7507

## (undated) DEVICE — BLADE KNIFE SURG 15 371115

## (undated) DEVICE — EYE KNIFE CRESCENT 55DEG 373807

## (undated) DEVICE — ESU ELEC BLADE 2.75" COATED/INSULATED E1455

## (undated) DEVICE — APPLICATOR COTTON TIP 3" PKG OF 10 34831010

## (undated) DEVICE — SOL NACL 0.9% IRRIG 500ML BOTTLE 2F7123

## (undated) DEVICE — ESU NDL COLORADO MICRO 3CM STR N103A

## (undated) DEVICE — DRAPE STERI TOWEL LG 1010

## (undated) RX ORDER — PROPOFOL 10 MG/ML
INJECTION, EMULSION INTRAVENOUS
Status: DISPENSED
Start: 2019-11-26

## (undated) RX ORDER — HYDROMORPHONE HYDROCHLORIDE 1 MG/ML
INJECTION, SOLUTION INTRAMUSCULAR; INTRAVENOUS; SUBCUTANEOUS
Status: DISPENSED
Start: 2019-05-02

## (undated) RX ORDER — OXYCODONE HYDROCHLORIDE 5 MG/1
TABLET ORAL
Status: DISPENSED
Start: 2019-11-26

## (undated) RX ORDER — FENTANYL CITRATE 50 UG/ML
INJECTION, SOLUTION INTRAMUSCULAR; INTRAVENOUS
Status: DISPENSED
Start: 2019-11-26

## (undated) RX ORDER — ONDANSETRON 2 MG/ML
INJECTION INTRAMUSCULAR; INTRAVENOUS
Status: DISPENSED
Start: 2019-11-26

## (undated) RX ORDER — GABAPENTIN 300 MG/1
CAPSULE ORAL
Status: DISPENSED
Start: 2019-11-26

## (undated) RX ORDER — BALANCED SALT SOLUTION 6.4; .75; .48; .3; 3.9; 1.7 MG/ML; MG/ML; MG/ML; MG/ML; MG/ML; MG/ML
SOLUTION OPHTHALMIC
Status: DISPENSED
Start: 2019-11-26

## (undated) RX ORDER — PROPOFOL 10 MG/ML
INJECTION, EMULSION INTRAVENOUS
Status: DISPENSED
Start: 2019-05-02

## (undated) RX ORDER — LIDOCAINE HYDROCHLORIDE 20 MG/ML
INJECTION, SOLUTION EPIDURAL; INFILTRATION; INTRACAUDAL; PERINEURAL
Status: DISPENSED
Start: 2019-11-26

## (undated) RX ORDER — DEXAMETHASONE SODIUM PHOSPHATE 4 MG/ML
INJECTION, SOLUTION INTRA-ARTICULAR; INTRALESIONAL; INTRAMUSCULAR; INTRAVENOUS; SOFT TISSUE
Status: DISPENSED
Start: 2019-11-26

## (undated) RX ORDER — KETAMINE HCL IN 0.9 % NACL 50 MG/5 ML
SYRINGE (ML) INTRAVENOUS
Status: DISPENSED
Start: 2019-05-02

## (undated) RX ORDER — OXYMETAZOLINE HYDROCHLORIDE 0.05 G/100ML
SPRAY NASAL
Status: DISPENSED
Start: 2019-11-26

## (undated) RX ORDER — ACETAMINOPHEN 325 MG/1
TABLET ORAL
Status: DISPENSED
Start: 2019-11-26

## (undated) RX ORDER — OXYCODONE HYDROCHLORIDE 5 MG/1
TABLET ORAL
Status: DISPENSED
Start: 2019-05-02

## (undated) RX ORDER — FENTANYL CITRATE 50 UG/ML
INJECTION, SOLUTION INTRAMUSCULAR; INTRAVENOUS
Status: DISPENSED
Start: 2019-05-02

## (undated) RX ORDER — ACETAMINOPHEN 325 MG/1
TABLET ORAL
Status: DISPENSED
Start: 2019-05-02